# Patient Record
Sex: FEMALE | Race: WHITE
[De-identification: names, ages, dates, MRNs, and addresses within clinical notes are randomized per-mention and may not be internally consistent; named-entity substitution may affect disease eponyms.]

---

## 2017-04-04 ENCOUNTER — HOSPITAL ENCOUNTER (OUTPATIENT)
Dept: HOSPITAL 25 - ED | Age: 77
Setting detail: OBSERVATION
LOS: 1 days | Discharge: HOME | End: 2017-04-05
Attending: INTERNAL MEDICINE | Admitting: HOSPITALIST
Payer: MEDICARE

## 2017-04-04 DIAGNOSIS — E86.0: Primary | ICD-10-CM

## 2017-04-04 DIAGNOSIS — R94.31: ICD-10-CM

## 2017-04-04 DIAGNOSIS — Z79.899: ICD-10-CM

## 2017-04-04 DIAGNOSIS — I51.7: ICD-10-CM

## 2017-04-04 DIAGNOSIS — Z88.8: ICD-10-CM

## 2017-04-04 DIAGNOSIS — E11.49: ICD-10-CM

## 2017-04-04 DIAGNOSIS — R00.0: ICD-10-CM

## 2017-04-04 DIAGNOSIS — I10: ICD-10-CM

## 2017-04-04 DIAGNOSIS — E78.5: ICD-10-CM

## 2017-04-04 DIAGNOSIS — I25.10: ICD-10-CM

## 2017-04-04 DIAGNOSIS — M17.10: ICD-10-CM

## 2017-04-04 DIAGNOSIS — Z88.0: ICD-10-CM

## 2017-04-04 DIAGNOSIS — Z79.84: ICD-10-CM

## 2017-04-04 DIAGNOSIS — R06.02: ICD-10-CM

## 2017-04-04 DIAGNOSIS — J90: ICD-10-CM

## 2017-04-04 DIAGNOSIS — G47.00: ICD-10-CM

## 2017-04-04 DIAGNOSIS — Z79.82: ICD-10-CM

## 2017-04-04 DIAGNOSIS — Z95.1: ICD-10-CM

## 2017-04-04 DIAGNOSIS — I31.3: ICD-10-CM

## 2017-04-04 DIAGNOSIS — R42: ICD-10-CM

## 2017-04-04 LAB
ADD DIFF/SLIDE REVIEW?: (no result)
ALBUMIN SERPL BCG-MCNC: 3.5 G/DL (ref 3.2–5.2)
ALP SERPL-CCNC: 71 U/L (ref 34–104)
ALT SERPL W P-5'-P-CCNC: 12 U/L (ref 7–52)
ANION GAP SERPL CALC-SCNC: 10 MMOL/L (ref 2–11)
AST SERPL-CCNC: 17 U/L (ref 13–39)
BUN SERPL-MCNC: 19 MG/DL (ref 6–24)
BUN/CREAT SERPL: 18.6 (ref 8–20)
CALCIUM SERPL-MCNC: 9 MG/DL (ref 8.6–10.3)
CHLORIDE SERPL-SCNC: 92 MMOL/L (ref 101–111)
GLOBULIN SER CALC-MCNC: 2.9 G/DL (ref 2–4)
GLUCOSE SERPL-MCNC: 142 MG/DL (ref 70–100)
HCO3 SERPL-SCNC: 29 MMOL/L (ref 22–32)
HCT VFR BLD AUTO: 38 % (ref 35–47)
HGB BLD-MCNC: 12.3 G/DL (ref 12–16)
MCH RBC QN AUTO: 28 PG (ref 27–31)
MCHC RBC AUTO-ENTMCNC: 33 G/DL (ref 31–36)
MCV RBC AUTO: 86 FL (ref 80–97)
POTASSIUM SERPL-SCNC: 3.5 MMOL/L (ref 3.5–5)
PROT SERPL-MCNC: 6.4 G/DL (ref 6.4–8.9)
RBC # BLD AUTO: 4.4 10^6/UL (ref 4–5.4)
SODIUM SERPL-SCNC: 131 MMOL/L (ref 133–145)
TROPONIN I SERPL-MCNC: 0.67 NG/ML (ref ?–0.04)
WBC # BLD AUTO: 13.8 10^3/UL (ref 3.5–10.8)

## 2017-04-04 PROCEDURE — 71010: CPT

## 2017-04-04 PROCEDURE — 93306 TTE W/DOPPLER COMPLETE: CPT

## 2017-04-04 PROCEDURE — 84484 ASSAY OF TROPONIN QUANT: CPT

## 2017-04-04 PROCEDURE — 85652 RBC SED RATE AUTOMATED: CPT

## 2017-04-04 PROCEDURE — 71275 CT ANGIOGRAPHY CHEST: CPT

## 2017-04-04 PROCEDURE — 96375 TX/PRO/DX INJ NEW DRUG ADDON: CPT

## 2017-04-04 PROCEDURE — 86140 C-REACTIVE PROTEIN: CPT

## 2017-04-04 PROCEDURE — 83605 ASSAY OF LACTIC ACID: CPT

## 2017-04-04 PROCEDURE — 85025 COMPLETE CBC W/AUTO DIFF WBC: CPT

## 2017-04-04 PROCEDURE — 36415 COLL VENOUS BLD VENIPUNCTURE: CPT

## 2017-04-04 PROCEDURE — 99284 EMERGENCY DEPT VISIT MOD MDM: CPT

## 2017-04-04 PROCEDURE — G0378 HOSPITAL OBSERVATION PER HR: HCPCS

## 2017-04-04 PROCEDURE — 96372 THER/PROPH/DIAG INJ SC/IM: CPT

## 2017-04-04 PROCEDURE — 70450 CT HEAD/BRAIN W/O DYE: CPT

## 2017-04-04 PROCEDURE — 80053 COMPREHEN METABOLIC PANEL: CPT

## 2017-04-04 PROCEDURE — 80048 BASIC METABOLIC PNL TOTAL CA: CPT

## 2017-04-04 PROCEDURE — 96374 THER/PROPH/DIAG INJ IV PUSH: CPT

## 2017-04-04 PROCEDURE — 93005 ELECTROCARDIOGRAM TRACING: CPT

## 2017-04-04 PROCEDURE — 83880 ASSAY OF NATRIURETIC PEPTIDE: CPT

## 2017-04-04 NOTE — RAD
INDICATION: Shortness of breath one week after quadruple bypass surgery



COMPARISON: Most recent comparison chest x-rays dated March 01, 2017

 

TECHNIQUE: Single AP portable view of the chest was obtained.



FINDINGS: 



Image quality is compromised due to the relative inferiority of a portable chest x-ray.



There is interval appearance of sternotomy wires and surgical clips overlying the

mediastinum.



The heart and mediastinum exhibit normal size and contour. Atherosclerotic calcification

is seen overlying the arch of the aorta.



The lungs are grossly clear. There is no evidence of a large pleural effusion.



Visualized bones are normal for the patient's age.



IMPRESSION:  Postoperative findings as described above without radiographic evidence of

acute cardiopulmonary abnormality.

## 2017-04-04 NOTE — RAD
INDICATION:  Headache 1 week after quadruple bypass surgery



COMPARISON: Similar examination dated January 23, 2016



TECHNIQUE: Contiguous axial sections of the brain were obtained from the skull base to the

vertex without contrast.



FINDINGS: There is stable hypoattenuation involving the right frontal lobe relative to the

previous CT examination. Postsurgical changes include craniectomy involving the right

frontal lobe. A stable surgical clip is seen at the right of midline sella turcica.



The gray-white matter differentiation is otherwise adequately maintained. There is no

focal mass, mass effect or midline shift. There is no extra-axial hemorrhage.



The visualized portion of the paranasal sinuses and mastoid air cells appear clear.



IMPRESSION:  Postoperative findings similar in appearance to the January 23, 2016 CT

examination as described above.

## 2017-04-04 NOTE — RAD
INDICATION: Shortness of breath one week after quadruple bypass surgery.



COMPARISON: None

 

TECHNIQUE: Axial source images were acquired following the administration of 74 mL of

Visipaque 320 intravenously and utilizing CT angiographic technique. Coronal and sagittal

reconstructed images were constructed and reviewed.



FINDINGS: 



There there are no filling defects in the pulmonary arteries to indicate acute pulmonary

embolic disease.



The lungs are grossly clear.



There is a small right-sided pleural effusion.



There is fluid surrounding the descending thoracic aorta at the medial margin of the

pleural space causing compressive atelectasis of the adjacent left lower lobe.



Contrast injected into the left upper extremity is seen tracking along the azygos and

hemiazygos veins refluxing inferiorly.



Postoperative findings include sternotomy wires and surgical clips overlying the sternum.

There is a small pericardial effusion.



There is no mediastinal, hilar, or axillary lymphadenopathy.



The visualized osseous structures appear normal.





Limited views of the upper abdomen show no abnormalities.



IMPRESSION:  

1. No CT of evidence of pulmonary embolism.  

2. Postoperative findings related to the patient's coronary artery bypass are described

above. There is fluid tracking around the descending thoracic aorta collecting in the

pleural space and causing a small degree of compressive atelectasis along the medial

margin of the left lower lobe.

3. There is a small pericardial effusion. 

4. There is a very small right-sided pleural effusion.

## 2017-04-05 VITALS — SYSTOLIC BLOOD PRESSURE: 133 MMHG | DIASTOLIC BLOOD PRESSURE: 45 MMHG

## 2017-04-05 LAB
ANION GAP SERPL CALC-SCNC: 10 MMOL/L (ref 2–11)
BUN SERPL-MCNC: 23 MG/DL (ref 6–24)
BUN/CREAT SERPL: 21.9 (ref 8–20)
CALCIUM SERPL-MCNC: 8.8 MG/DL (ref 8.6–10.3)
CHLORIDE SERPL-SCNC: 92 MMOL/L (ref 101–111)
GLUCOSE SERPL-MCNC: 145 MG/DL (ref 70–100)
HCO3 SERPL-SCNC: 27 MMOL/L (ref 22–32)
HCT VFR BLD AUTO: 35 % (ref 35–47)
HGB BLD-MCNC: 11.4 G/DL (ref 12–16)
MCH RBC QN AUTO: 28 PG (ref 27–31)
MCHC RBC AUTO-ENTMCNC: 33 G/DL (ref 31–36)
MCV RBC AUTO: 86 FL (ref 80–97)
POTASSIUM SERPL-SCNC: 3.9 MMOL/L (ref 3.5–5)
RBC # BLD AUTO: 4.02 10^6/UL (ref 4–5.4)
SODIUM SERPL-SCNC: 129 MMOL/L (ref 133–145)
WBC # BLD AUTO: 12.3 10^3/UL (ref 3.5–10.8)

## 2017-04-05 RX ADMIN — INSULIN LISPRO SCH UNITS: 100 INJECTION, SOLUTION INTRAVENOUS; SUBCUTANEOUS at 12:39

## 2017-04-05 RX ADMIN — HEPARIN SODIUM SCH UNITS: 5000 INJECTION INTRAVENOUS; SUBCUTANEOUS at 06:24

## 2017-04-05 RX ADMIN — NATEGLINIDE SCH: 60 TABLET ORAL at 12:27

## 2017-04-05 RX ADMIN — INSULIN LISPRO SCH UNITS: 100 INJECTION, SOLUTION INTRAVENOUS; SUBCUTANEOUS at 06:23

## 2017-04-05 RX ADMIN — INSULIN LISPRO SCH UNITS: 100 INJECTION, SOLUTION INTRAVENOUS; SUBCUTANEOUS at 03:03

## 2017-04-05 RX ADMIN — NATEGLINIDE SCH: 60 TABLET ORAL at 08:42

## 2017-04-05 RX ADMIN — HEPARIN SODIUM SCH UNITS: 5000 INJECTION INTRAVENOUS; SUBCUTANEOUS at 12:38

## 2017-04-05 NOTE — ECHO
Patient:      ALEXEY ARANDA Rec#:     [id number]            :          1940          

Date:         2017            Age:          77y                 

Account#:     [id number]          Height:       157.48 cm / 62.0 in

Accession#:   [id number]           Weight:       74.84 kg / 164.9 lbs

Sex:          F                     BSA:          1.76

Room#:        [facility]                 

Admit Date#:  2017          

Type:         Inpatient

 

Referring:    Ellis Mendes MD

Reading:      Paola Parikh MD

Sonographer:  Cande Smith RDCS

CC:           Evan Dixon MD

______________________________________________________________________

 

Transthoracic Echocardiogram

 

Indication:

CAD/s/p CABG

BP:           109/42

HR:           81

Rhythm:       NSR

 

Findings     

History:

9 days s/p CABG x4,CAD,DM,HTN,HLD,former smoker. 

 

Technical Comments:

The study is technically limited due to the patient's smoking history.  

Completed at 1138. 

 

Left Ventricle:

The left ventricular chamber size is decreased. Moderate concentric left

ventricular hypertrophy is observed. The estimated ejection fraction is

55-60%.  Post surgical hypokinesis of the interventricular septum is

observed consistent with coronary artery bypass.  Abnormal left

ventricular diastolic function is observed. 

 

Left Atrium:

The left atrial chamber size is normal. 

 

Right Ventricle:

The right ventricular cavity size is normal. The right ventricular

global systolic function is normal. The septum has abnormal paradoxical

motion consistent with post-operative pressure. 

 

Right Atrium:

The right atrial cavity size is normal. 

 

Aortic Valve:

The aortic valve is trileaflet. The aortic valve leaflets are mildly

thickened.  Best visualization from A5C. There is mild aortic

regurgitation.  

 

Mitral Valve:

There is mitral annular calcification. The mitral valve leaflets are

mildly thickened. There is trace to mild mitral regurgitation. There is

no evidence of mitral stenosis. 

 

Tricuspid Valve:

The tricuspid valve leaflets are normal.  There is mild tricuspid

regurgitation. There is evidence of mild to moderate pulmonary

hypertension. There is no tricuspid stenosis. 

 

Pulmonic Valve:

The pulmonic valve appears normal. There is no evidence of pulmonic

regurgitation. There is no pulmonic stenosis.  

 

Pericardium:

There is no pericardial effusion. 

 

Aorta:

There is no dilatation of the ascending aorta. There is no dilatation of

the aortic arch. There is no dilation of the aortic root. 

 

Pulmonary Artery:

The main pulmonary artery is not well visualized. 

 

Venous:

The venous system is not well visualized. 

 

Conclusions

Moderate concentric left ventricular hypertrophy is observed.

Expected septal dyskinesis c/w post CABG noted, all other areas of the

myocardium moved normally with normal to hyperdynamic systolic

function.

The estimated ejection fraction is 55-60%. 

Abnormal left ventricular diastolic function is observed.

The right ventricular global systolic function is normal.

Aortic valve sclerosis with mild aortic regurgitation, no reversal of

flow in the descending aorta. 

There is trace to mild mitral regurgitation.

There is mild tricuspid regurgitation.

There is evidence of mild to moderate pulmonary hypertension:  48 mmHg.

There is no pericardial effusion.

Technically limited as expected with recent CABG.

Compared with prior echo of 3/9/17, left ventricular function is stable,

RV no longer hyperdynamic, PA pressure has increased from 30 mmHg.

 

Measurements     

Name                    Value         Normal Range            

RVIDd (AP) 2D           3 cm          (0.9 - 2.6)             

RVDdMajor (2D)          3.8 cm        (2.2 - 4.4)             

RAd ISD 4CH             4.3 cm        (3.4 - 4.9)             

RA (A4C)W               3.3 cm        (2.9 - 4.6)             

IVSd (2D)               1.4 cm        (0.6 - 1)               

LVPWd (2D)              1.3 cm        (0.6 - 1)               

LVIDd (2D)              3.1 cm        (3.6 - 5.4)             

LVIDs (2D)              2.1 cm        -                        

LV FS (2D)              32 %          (25 - 45)               

Aortic Annulus          1.5 cm        (1.4 - 2.6)             

Ao root diameter (2D)   3.2 cm        (2.1 - 3.5)             

Ascending Ao            3.2 cm        (2.1 - 3.4)             

Aortic arch             2.7 cm        (1.8 - 3.4)             

LA dimension (AP) 2D    3.7 cm        (2.3 - 3.8)             

LAd ISD 4CH             4.9 cm        (2.9 - 5.3)             

LA ISD 4CH W            3.5 cm        (2.5 - 4.5)             

 

Name                    Value         Normal Range            

LA ESV SP 4CH (A/L)     37 ml         -                        

LA ESV SP 2CH (A/L)     36 ml         -                        

LA ESV BP (A/L)         37 ml         -                        

LA ESV BP (A/L) index   20.9 ml/m2    -                        

LA ESV SP 4CH (MOD)     33 ml         -                        

LA ESV SP 2CH (MOD)     36 ml         -                        

 

Name                    Value         Normal Range            

MV E-wave Vmax          0.7 m/sec     -                        

MV deceleration time    253 msec      -                        

MV A-wave Vmax          1.1 m/sec     -                        

MV E:A ratio            0.68 ratio    -                        

LV septal e' Vmax       0.04 m/sec    -                        

LV lateral e' Vmax      0.04 m/sec    -                        

LV E:e' septal ratio    17.5 ratio    -                        

LV E:e' lateral ratio   17.5 ratio    -                        

 

Name                    Value         Normal Range            

AV Vmax                 1.9 m/sec     -                        

AV VTI                  36.2 cm       -                        

AV peak gradient        13.18 mmHg    -                        

AV mean gradient        7.18 mmHg     -                        

LVOT Vmax               1.6 m/sec     -                        

LVOT VTI                38 cm         -                        

LVOT peak gradient      9.91 mmHg     -                        

LVOT mean gradient      4.61 mmHg     -                        

AR PHT                  341 msec      -                        

AR peak gradient        60.06 mmHg    -                        

 

Name                    Value         Normal Range            

TR Vmax                 3.2 m/sec     -                        

TR peak gradient        40 mmHg       -                        

RAP                     8 mmHg        -                        

RVSP                    48 mmHg       -                        

 

Name                    Value         Normal Range            

PV Vmax                 0.7 m/sec     -                        

PV peak gradient        1.9 mmHg      -                        

 

Electronically signed by: Paola Parikh MD on 2017 12:09:37

## 2017-04-05 NOTE — ED
Yung GAONA Adam, scribed for Raul Groves MD on 04/04/17 at 1919 .





Complex/Multi-Sys Presentation





- HPI Summary


HPI Summary: 


Pt is a 77 year old female presenting with SOB and multiple other complaints 

since a CABG last week. She states that the visiting nurse was at the house 

today and the pt was getting short of breath, dizzy, and diaphoretic while 

walking to the kitchen. She currently c/o pain in her upper back between the 

spine and shoulder blade that set on 2 days ago. She had a severe HA yesterday. 

She also c/o blurred vision and humming in her ears today. She denies fever, 

chills, vomiting, diarrhea, cough, weight gain. Pt was discharged 4 days ago 

after having CABG x4 in [place] 1 week ago.  








- History Of Current Complaint


Chief Complaint: EDShortnessOfBreath


Time Seen by Provider: 04/04/17 17:40


Hx Obtained From: Patient


Onset/Duration: Gradual Onset, Lasting Days, Still Present


Timing: Constant


Severity Currently: Moderate


Severity Initially: Moderate


Location: Pain At: - Upper back


Character: Typical Headache


Associated Signs And Symptoms: Positive: Dizziness, SOB, Diaphoresis, Other - 

Blurred vision, humming in ears





- Allergies/Home Medications


Allergies/Adverse Reactions: 


 Allergies











Allergy/AdvReac Type Severity Reaction Status Date / Time


 


Penicillins Allergy Unknown See Comment Verified 10/28/16 06:18


 


Dapagliflozin [From Farxiga] Allergy  Unknown Verified 03/23/17 16:25





   Reaction  





   Details  


 


EPIDURAL Allergy  PT STATES Uncoded 10/28/16 06:18





   SHE GOT TO  





   MUCH  











Home Medications: 


 Home Medications





Aspirin EC Low Dose* [Ecotrin EC Low Dose 81 MG*] 81 mg PO DAILY 04/04/17 [

History Confirmed 04/04/17]


Furosemide TAB* [Lasix TAB*] 40 mg PO DAILY 04/04/17 [History Confirmed 04/04/17

]


HYDROcodone/ACETAMIN 5-325 MG* [Norco 5-325 TAB*] 1 - 2 tab PO Q4H PRN MDD 12 

tabs 04/04/17 [History Confirmed 04/04/17]


Magnesium Oxide TAB* [MagOx 400 TAB*] 400 mg PO DAILY 04/04/17 [History 

Confirmed 04/04/17]


Metoclopramide TAB* [Reglan TAB*] 5 mg PO TID AC PRN 04/04/17 [History 

Confirmed 04/04/17]


Nateglinide(NF) [Starlix(NF)] 60 mg PO TID AC 04/04/17 [History Confirmed 04/04/ 17]


Potassium Chlor TAB* [Klor Con ER TAB*] 20 meq PO DAILY 04/04/17 [History 

Confirmed 04/04/17]


Simvastatin (NF) [Zocor (NF)] 40 mg PO BEDTIME 04/04/17 [History Confirmed 04/04 /17]


metFORMIN* [Glucophage 1000 MG TAB *] 1,000 mg PO BID WITH MEALS 04/04/17 [

History Confirmed 04/04/17]











PMH/Surg Hx/FS Hx/Imm Hx


Endocrine/Hematology History: Reports: Hx Diabetes - type 2


   Denies: Hx Thyroid Disease


Cardiovascular History: Reports: Hx Hypertension


   Denies: Other Cardiovascular Problems/Disorders


Respiratory History: Reports: Hx Sleep Apnea - NOT DIAGNOSED


   Denies: Hx Asthma, Hx Chronic Obstructive Pulmonary Disease (COPD)


GI History: Reports: Hx Hiatal Hernia - SURGICALLY REPAIRED 1993


   Denies: Hx Ulcer, Other GI Disorders


Musculoskeletal History: Reports: Hx Arthritis - RIGHT KNEE, Hx Rheumatoid 

Arthritis


   Denies: Hx Osteoporosis, Other Musculoskeletal History


Sensory History: Reports: Hx Cataracts - ROMERO, Hx Contacts or Glasses - GLASSES


   Denies: Hx Hearing Aid


Opthamlomology History: Reports: Hx Cataracts - ROMERO, Hx Contacts or Glasses - 

GLASSES


Neurological History: Reports: Hx Migraine - PAST ONLY, Other Neuro Impairments/

Disorders - BRAIN ANEURYSM 1994 WITH METAL CLIP


Psychiatric History: Reports: Hx Anxiety - MILD, NO MEDS





- Surgical History


Surgery Procedure, Year, and Place: 4 c sections, gallbladder removed, many 

hernias, eye sugery


Hx Anesthesia Reactions: Yes - N/V





- Immunization History


Date of Tetanus Vaccine: Unk


Date of Influenza Vaccine: Fall 2015


Infectious Disease History: 


   Denies: Hx Hepatitis, Hx Human Immunodeficiency Virus (HIV), Traveled 

Outside the [place] in Last 30 Days





- Family History


Known Family History: Positive: Other - Negative: osteoporosis, breast cancer, 

malignant hyperthermia





- Social History


Occupation: Retired


Lives: Alone


Alcohol Use: None


Hx Substance Use: No


Substance Use Type: Reports: None


Hx Tobacco Use: No


Smoking Status (MU): Never Smoked Tobacco


Have You Smoked in the Last Year: No





Review of Systems


Positive: Skin Diaphoresis.  Negative: Fever, Chills


Positive: Blurred Vision.  Negative: Erythema


Positive: Other - Humming in ears.  Negative: Sore Throat


Negative: Chest Pain


Positive: Shortness Of Breath.  Negative: Cough


Negative: Abdominal Pain, Vomiting, Nausea


Negative: dysuria, hematuria


Positive: Other - Back pain.  Negative: Edema


Negative: Rash


Neurological: Other - Dizziness


Positive: Headache


All Other Systems Reviewed And Are Negative: Yes





Physical Exam





- Summary


Physical Exam Summary: 


Constitutional: Well-developed, Well-nourished, Alert. (-) Distressed


Skin: Warm, Dry


HENT: Normocephalic; Atraumatic


Eyes: Conjunctiva normal


Neck: Musculoskeletal ROM normal neck. (-) JVD, (-) Stridor, (-) Tracheal 

deviation


Cardio: Rhythm regular, rate normal, Heart sounds normal; Intact distal pulses; 

The pedal pulses are 2+ and symmetric. Radial pulses are 2+ and symmetric.  (-) 

Murmur


Pulmonary/Chest wall: Effort normal. (-) Respiratory distress, (-) Wheezes, (-) 

Rales


Abd: Soft, (-) Tenderness,  (-) Distension, (-) Guarding, (-) Rebound


Musculoskeletal: (-) Edema


Lymph: (-) Cervical adenopathy


Neuro: Alert, Oriented x3


Psych: Mood and affect Normal





Triage Information Reviewed: Yes


Vital Signs On Initial Exam: 


 Initial Vitals











Temp Pulse Resp BP Pulse Ox


 


 98.4 F   115   20   114/54   98 


 


 04/04/17 17:42  04/04/17 17:42  04/04/17 17:42  04/04/17 17:42  04/04/17 17:42











Vital Signs Reviewed: Yes





- [place] Coma Scale


Coma Scale Total: 15





Diagnostics





- Vital Signs


 Vital Signs











  Temp Pulse Resp BP Pulse Ox


 


 04/04/17 18:00    18  114/54 


 


 04/04/17 17:42  98.4 F  115  20  114/54  98














- Laboratory


Result Diagrams: 


 04/04/17 19:55





 04/04/17 19:55


Lab Statement: Any lab studies that have been ordered have been reviewed, and 

results considered in the medical decision making process.





- Radiology


  ** CXR


Radiology Interpretation Completed By: Radiologist - IMPRESSION:  Postoperative 

findings as described above without radiographic evidence of acute 

cardiopulmonary abnormality.





- CT


  ** CHEST/THORAX CTA


CT Interpretation Completed By: Radiologist - IMPRESSION:   1. No CT of 

evidence of pulmonary embolism.   2. Postoperative findings related to the 

patient's coronary artery bypass are described above. There is fluid tracking 

around the descending thoracic aorta collecting in the pleural space and 

causing a small degree of compressive atelectasis along the medial margin of 

the left lower lobe. 3. There is a small pericardial effusion.  4. There is a 

very small right-sided pleural effusion.





  ** BRAIN


CT Interpretation Completed By: Radiologist - IMPRESSION:  Postoperative 

findings similar in appearance to the January 23, 2016 CT examination as 

described above.





- EKG


  ** 20:03


Cardiac Rate: Tachycardia - 112 BPM


EKG Rhythm: Sinus Tachycardia





- Additional Comments


Diagnostic Additional Comments: 


Troponin I - 0.67








Complex Multi-Symp Course/Dx


Course Of Treatment: Initial patient contact made at 19:15.  Medical record 

review: Pt was having outpatient cardiac catheterization and was transferred to 

[facility] for severe three vessel disease. It appears that no troponin 

testing was done at [facility]. Pt developed post-op anemia and 

trasfused 2 units of packed red blood cells.


Assessment/Plan: Elevation of troponin post-operatively. Less likely MI. No 

baseline labs for comparison. No evidence of fluid overload. Pt is actually 

losing weight.





- Diagnoses


Provider Diagnoses: 


 Pleural effusion, Pericardial effusion, Shortness of breath, Elevated troponin








- Physician Notifications


Discussed Care Of Patient With: Dr. Duenas (cardio) at 22:15. We discussed lab 

values, physical exam, and vital signs. He recommends the pt be admitted to [facility] 

with serial troponin and echocardiogram in the morning. Rule out progressive 

pericardial effusion. He also recommended utilizing additional nonsteroidals. 

Also sed rate and CRP. We felt that the findings on the CT were likely post-

operative.





Discharge





- Discharge Plan


Condition: Stable


Disposition: ADMITTED TO [facility]


Referrals: 


Evan Dixon MD [Primary Care Provider] - 





The documentation as recorded by the Yung ruiz Adam accurately reflects 

the service I personally performed and the decisions made by me, Raul Groves MD.

## 2017-04-05 NOTE — HP
HISTORY AND PHYSICAL:

 

DATE OF ADMISSION:  17

 

PRIMARY CARE PHYSICIAN:  Dr. Evan Dixon.

 

HISTORY OF PRESENT ILLNESS:  The patient is a 77-year-old woman, status post 
coronary artery bypass just 1 week ago whose visiting nurse was seeing her when 
she got out of her chair and became lightheaded and dizzy.  She was also 
sweating profusely.  She denied any chest pain, but was little short of breath.
  She had no loss of consciousness.  She had no palpitations.  She did lie on 
the table for quite a while and finally, the symptoms resolved.  She had nausea 
and vomiting. Although she has not eaten much recently and she has occasionally 
taken her pain medications of Norco.  Furthermore, she was placed on furosemide 
recently.

 

PAST MEDICAL HISTORY:  As noted, the patient has 4-vessel CABG on 17 at 
[place], coronary artery disease, bezoar, gastroparesis, type 2 diabetes 
mellitus, osteoarthritis of the knee, carpal tunnel syndrome, insomnia, 
diabetic neurological disorder, hypertension, hyperlipidemia.

 

PAST SURGICAL HISTORY:  Includes CABG, hernia repair,  x4, Nissen 
fundoplication, cholecystectomy, cerebral aneurysm repair, cataract removal, 
partial meniscectomy.

 

CURRENT MEDICATIONS:  As follows:

1.  Metformin 1000 mg twice daily.

2.  Simvastatin 40 mg at bedtime.

3.  Potassium chloride 20 mEq daily.

4.  Starlix 60 mg 3 times a day.

5.  Metoprolol titrate 25 mg twice daily.

6.  Reglan 5 mg 3 times a day as needed.

7.  Magnesium oxide 100 mg daily.

8.  Norco 5/325 as needed for pain.

9.  Furosemide 40 mg daily, supposed to end tomorrow.

10.  Enalapril 10 mg twice daily, was just recently discontinued.

11.  Aspirin 81 mg daily.

 

ALLERGIES:  She has allergy/adverse reaction to PENICILLIN and DAPAGLIFLOZIN.

 

FAMILY HISTORY:  Significant for diabetes and cancer.  Father had diabetes.

 

SOCIAL HISTORY:  She is a .  Her daughter, Diane Dawkins, is her healthcare 
proxy.  She is ex-tobacco, smoked a pack a day for 2 years, quit at age 20.  No 
alcohol or recreational drug use.  She is retired.

 

REVIEW OF SYSTEMS:  A 14-point review of systems was completed with the 
patient. All pertinent positives and negatives are in the history of present 
illness, otherwise it is negative.

 

                               PHYSICAL EXAMINATION

 

GENERAL:  Pleasant woman, lying in bed, in no acute distress.

 

VITAL SIGNS:  Blood pressure 125/76, pulse ox 96%, respiratory rate 26 breaths 
per minute, heart rate 108 beats per minute, temperature is 98.4 degrees.

 

HEENT:  Normocephalic, atraumatic.  Pupils equal, round and reactive to light. 
Moist Mucous membranes.

 

NECK:  Supple.  No JVD, bruits, palpable thyroid, or lymphadenopathy.

 

CHEST:  Clear to auscultation and percussion bilaterally.

 

CARDIOVASCULAR:  S1, S2 appreciated.

 

ABDOMEN:  Positive bowel sounds in all 4 quadrants.  Soft, nontender.

 

EXTREMITIES:  No cyanosis, clubbing, or edema.  +2 peripheral pulses 
bilaterally.

 

NEURO:  Alert and oriented x3.  Moves all extremities.

 

SKIN:  No rashes or abnormalities.

 

 DIAGNOSTIC STUDIES/LAB DATA:  White count 13.8, hemoglobin 12.3, hematocrit 38
, platelets 171.  Sodium 131, potassium 3.5, chloride 92, CO2 29, BUN 19, 
creatinine 1.00, troponin is 0.67, BNP is 271.  EKG shows sinus tachycardia at 
112 beats per minute, normal axis, Q's in II, III, and F, LVH.  



Brain CT shows postoperative findings similar to 16.  Examination as 
described above.  



Chest x-ray was interpreted by Radiology as postoperative findings described 
above without radiographic evidence of acute cardiopulmonary abnormality.  



CTA of the chest shows as interpreted by Radiology no CT evidence of pulmonary 
embolism.  Post-operative findings related to the patient's coronary artery 
bypass as described above.  Fluid tracking around the descending thoracic aorta 
collecting in the pleural space and causing a small degree compression 
atelectasis along the medial margin of the left lower lobe.  There is a small 
pericardial effusion.  There is a small right-sided pleural effusion.

 

ASSESSMENT AND PLAN:

1.  Dizziness and lightheadedness, status post CABG.  I highly doubt this is 
secondary to her recent surgery or a manifestation of coronary artery disease.  
Her troponin likely is elevated from her recent surgery.  We will cycle them, 
however. I will place her on telemetry.  I will also get a transverse 
echocardiogram because of the small pericardial effusion, but again this is 
likely secondary to some inflammation post CABG.  I think her lightheadedness 
and dizziness may be from being on furosemide, not eating much and her nausea 
and vomiting could be from the narcotics and not eating much.  We will observe 
overnight by anticipation and will do well and go home fairly shortly.

2.  Diabetes mellitus.  Hold metformin, fingersticks with sliding scale insulin.

3.  Hypertension, good control.  Continue current regimen.

4.  Hyperlipidemia.  Continue statin, stable.

5.  FEN.  NPO after midnight just in case she needs intervention of some kind.

6.  The patient is a full code.

 

TIME SPENT:  Over 75 minutes were spent on this H and P, more than 40 minutes 
of which was spent in direct face-to-face contact with the patient in evaluation
, physical exam, counseling, and coordination of care.

 

 CC:  Dr. Evan Dixon; Scott Hoang,  *

 

[id number]/CPS #: [id number]

SARAVANAN

## 2017-04-06 NOTE — DS
DISCHARGE SUMMARY:

 

DATE OF ADMISSION:  04/04/17

 

DATE OF DISCHARGE:  04/05/17

 

PRIMARY DIAGNOSIS:  Dehydration.

 

SECONDARY DIAGNOSES:  Include:

1.  Coronary artery disease, status post coronary artery bypass grafting on 03/
27/17, [facility].

2.  Type 2 diabetes.

3.  Hypertension.

4.  Hyperlipidemia.

 

MEDICATIONS ON DISCHARGE:

1.  Enalapril 10 mg twice daily.

2.  Zocor 50 mg at bedtime.

3.  Starlix 60 mg 3 times a day with meals.

4.  Metoprolol tartrate 25 mg twice daily.

5.  Reglan 5 mg 3 times a day with meals as needed.

6.  Metformin 1000 mg twice daily with meals.

7.  Magnesium oxide 400 mg daily.

8.  Norco 5/325 one to two tabs every 4 hours as needed for pain.

9.  Aspirin 81 mg daily.

 

PERTINENT LABORATORY DATA:  Troponin I 0.68 on three consecutive checks and 
then decreased to 0.54. ESR is 39 with a white count of 12.5, which is 78% 
neutrophils.

 

PERTINENT DIAGNOSTIC DATA:  Chest CTA, impression:  No CT evidence of pulmonary 
embolism.  Postoperative findings related to the patient's CABG including _____ 
tracking above the descending thoracic aorta collecting in the pleural space 
and causing a small degree of compressive atelectasis along the medial margin 
of the left lower lobe.  Small pericardial effusion.  Very small right-sided 
pleural effusion.

 

Transthoracic echocardiogram, impression:  Moderate concentric left ventricular 
hypertrophy.  Expected septal dyskinesis consistent with post CABG, all other 
areas of myocardium move normally with normal-to-hyperdynamic systolic 
function. Estimated EF 55% to 60%.  Abnormal left ventricular diastolic 
function.  RV global systolic function is normal.  Aortic valve sclerosis with 
mild AR.  Trace-to-mild MR, mild TR, evidence of mild-to-moderate pulmonary 
hypertension.  No pericardial effusion.

 

HISTORY OF PRESENT ILLNESS AND HOSPITAL COURSE:  This is a pleasant 77-year-old 
female, past medical history recent 4-vessel CABG on March 27th, discharged 
home 4 days prior to presentation.  She had been feeling well for the first 2 
days prior to presentation; however, developed increasing shortness of breath 
over the day prior to presentation.  She noted when she got up to walk, she 
became severely lightheaded.  Of note, she was discharged on Lasix for a 
limited course for the last day to be today.  She felt severely thirsty.  She 
was admitted to the hospital.  Her troponins were monitored and she received IV 
fluids.  With the receipt of approximately a liter to a liter and a half of IV 
fluids, the patient's lightheadedness improved.  She had negative orthostatic 
vital signs and her heart rate improved from the 100s down to the 90s.  Of note
, last recorded heart rate in the computer is 112; however, is now high 80s to 
low 90s.  She was ruled out for pulmonary embolism.  She was ambulating back 
and forth to the bathroom without complaints.  No lightheadedness.  No chest 
pain.  No shortness of breath and is anxious to return home.  Her exam is 
notable for absence of infection in her sternotomy nor in her left lower 
extremity venous graft harvest sites.  Her lungs are clear, although a small 
amount of atelectasis and a small pleural effusion may have been contributing 
to her increasing shortness of breath status post her hospital stay and 
operation.

 

FOLLOWUP INSTRUCTIONS:  At followup, please:

1.  Follow up for resolution of symptoms.

2.  Monitor volume status off Lasix.

3.  No other specific labs or vitals that need followup.

 

Reasons to return to the hospital including but not limited to recurrent or 
worsening symptoms including chest pain, shortness of breath, nausea, vomiting, 
lightheadedness, loss of consciousness, near loss of consciousness, fever, 
chills, night sweats, bleeding from any source, increased redness or pain in 
any place from her sternotomy wound or graft sites were discussed with the 
patient.  She acknowledged understanding.

 

TIME SPENT:  Greater than 50 minutes was spent on discharge of this patient, 
greater than half was spent face-to-face with the patient.

 

 [id number]/[facility] #: [id number]

SARAVANAN

## 2017-04-25 ENCOUNTER — HOSPITAL ENCOUNTER (INPATIENT)
Dept: HOSPITAL 25 - ED | Age: 77
LOS: 2 days | Discharge: HOME | DRG: 176 | End: 2017-04-27
Attending: HOSPITALIST | Admitting: HOSPITALIST
Payer: MEDICARE

## 2017-04-25 DIAGNOSIS — E13.43: ICD-10-CM

## 2017-04-25 DIAGNOSIS — F41.9: ICD-10-CM

## 2017-04-25 DIAGNOSIS — I26.99: Primary | ICD-10-CM

## 2017-04-25 DIAGNOSIS — E78.5: ICD-10-CM

## 2017-04-25 DIAGNOSIS — M17.11: ICD-10-CM

## 2017-04-25 DIAGNOSIS — Z87.891: ICD-10-CM

## 2017-04-25 DIAGNOSIS — I27.2: ICD-10-CM

## 2017-04-25 DIAGNOSIS — Z79.82: ICD-10-CM

## 2017-04-25 DIAGNOSIS — H53.8: ICD-10-CM

## 2017-04-25 DIAGNOSIS — G56.00: ICD-10-CM

## 2017-04-25 DIAGNOSIS — I25.10: ICD-10-CM

## 2017-04-25 DIAGNOSIS — K31.84: ICD-10-CM

## 2017-04-25 DIAGNOSIS — Z88.8: ICD-10-CM

## 2017-04-25 DIAGNOSIS — I08.3: ICD-10-CM

## 2017-04-25 DIAGNOSIS — Z79.01: ICD-10-CM

## 2017-04-25 DIAGNOSIS — G43.909: ICD-10-CM

## 2017-04-25 DIAGNOSIS — Z95.1: ICD-10-CM

## 2017-04-25 DIAGNOSIS — Z86.718: ICD-10-CM

## 2017-04-25 DIAGNOSIS — M06.9: ICD-10-CM

## 2017-04-25 DIAGNOSIS — Z98.41: ICD-10-CM

## 2017-04-25 DIAGNOSIS — Z80.0: ICD-10-CM

## 2017-04-25 DIAGNOSIS — Z98.42: ICD-10-CM

## 2017-04-25 DIAGNOSIS — I10: ICD-10-CM

## 2017-04-25 DIAGNOSIS — Z80.3: ICD-10-CM

## 2017-04-25 DIAGNOSIS — Z83.3: ICD-10-CM

## 2017-04-25 DIAGNOSIS — E11.40: ICD-10-CM

## 2017-04-25 DIAGNOSIS — Z79.84: ICD-10-CM

## 2017-04-25 DIAGNOSIS — G47.30: ICD-10-CM

## 2017-04-25 DIAGNOSIS — Z88.0: ICD-10-CM

## 2017-04-25 LAB
ALBUMIN SERPL BCG-MCNC: 3.7 G/DL (ref 3.2–5.2)
ALP SERPL-CCNC: 86 U/L (ref 34–104)
ALT SERPL W P-5'-P-CCNC: 11 U/L (ref 7–52)
ANION GAP SERPL CALC-SCNC: 9 MMOL/L (ref 2–11)
AST SERPL-CCNC: 15 U/L (ref 13–39)
BUN SERPL-MCNC: 12 MG/DL (ref 6–24)
BUN/CREAT SERPL: 17.1 (ref 8–20)
CALCIUM SERPL-MCNC: 9.4 MG/DL (ref 8.6–10.3)
CHLORIDE SERPL-SCNC: 101 MMOL/L (ref 101–111)
CK SERPL-CCNC: 17 U/L (ref 10–223)
GLOBULIN SER CALC-MCNC: 3.2 G/DL (ref 2–4)
GLUCOSE SERPL-MCNC: 99 MG/DL (ref 70–100)
HCO3 SERPL-SCNC: 24 MMOL/L (ref 22–32)
HCT VFR BLD AUTO: 34 % (ref 35–47)
HGB BLD-MCNC: 10.8 G/DL (ref 12–16)
LIPASE SERPL-CCNC: 21 U/L (ref 11–82)
MAGNESIUM SERPL-MCNC: 1.8 MG/DL (ref 1.9–2.7)
MCH RBC QN AUTO: 27 PG (ref 27–31)
MCHC RBC AUTO-ENTMCNC: 32 G/DL (ref 31–36)
MCV RBC AUTO: 86 FL (ref 80–97)
POTASSIUM SERPL-SCNC: 4.7 MMOL/L (ref 3.5–5)
PROT SERPL-MCNC: 6.9 G/DL (ref 6.4–8.9)
RBC # BLD AUTO: 3.97 10^6/UL (ref 4–5.4)
SODIUM SERPL-SCNC: 134 MMOL/L (ref 133–145)
TROPONIN I SERPL-MCNC: 0.01 NG/ML (ref ?–0.04)
TSH SERPL-ACNC: 0.29 MCIU/ML (ref 0.34–5.6)
WBC # BLD AUTO: 7.2 10^3/UL (ref 3.5–10.8)

## 2017-04-25 PROCEDURE — 70450 CT HEAD/BRAIN W/O DYE: CPT

## 2017-04-25 PROCEDURE — 87186 SC STD MICRODIL/AGAR DIL: CPT

## 2017-04-25 PROCEDURE — 71275 CT ANGIOGRAPHY CHEST: CPT

## 2017-04-25 PROCEDURE — 84443 ASSAY THYROID STIM HORMONE: CPT

## 2017-04-25 PROCEDURE — 36415 COLL VENOUS BLD VENIPUNCTURE: CPT

## 2017-04-25 PROCEDURE — 82553 CREATINE MB FRACTION: CPT

## 2017-04-25 PROCEDURE — 93005 ELECTROCARDIOGRAM TRACING: CPT

## 2017-04-25 PROCEDURE — 81015 MICROSCOPIC EXAM OF URINE: CPT

## 2017-04-25 PROCEDURE — 83880 ASSAY OF NATRIURETIC PEPTIDE: CPT

## 2017-04-25 PROCEDURE — 93306 TTE W/DOPPLER COMPLETE: CPT

## 2017-04-25 PROCEDURE — 70496 CT ANGIOGRAPHY HEAD: CPT

## 2017-04-25 PROCEDURE — 85025 COMPLETE CBC W/AUTO DIFF WBC: CPT

## 2017-04-25 PROCEDURE — 71010: CPT

## 2017-04-25 PROCEDURE — 87086 URINE CULTURE/COLONY COUNT: CPT

## 2017-04-25 PROCEDURE — 81003 URINALYSIS AUTO W/O SCOPE: CPT

## 2017-04-25 PROCEDURE — 87077 CULTURE AEROBIC IDENTIFY: CPT

## 2017-04-25 PROCEDURE — 70498 CT ANGIOGRAPHY NECK: CPT

## 2017-04-25 PROCEDURE — 85610 PROTHROMBIN TIME: CPT

## 2017-04-25 PROCEDURE — 86140 C-REACTIVE PROTEIN: CPT

## 2017-04-25 PROCEDURE — 82550 ASSAY OF CK (CPK): CPT

## 2017-04-25 PROCEDURE — 84484 ASSAY OF TROPONIN QUANT: CPT

## 2017-04-25 PROCEDURE — 83735 ASSAY OF MAGNESIUM: CPT

## 2017-04-25 PROCEDURE — 83605 ASSAY OF LACTIC ACID: CPT

## 2017-04-25 PROCEDURE — 85379 FIBRIN DEGRADATION QUANT: CPT

## 2017-04-25 PROCEDURE — 83690 ASSAY OF LIPASE: CPT

## 2017-04-25 PROCEDURE — 85730 THROMBOPLASTIN TIME PARTIAL: CPT

## 2017-04-25 PROCEDURE — 80048 BASIC METABOLIC PNL TOTAL CA: CPT

## 2017-04-25 PROCEDURE — 80053 COMPREHEN METABOLIC PANEL: CPT

## 2017-04-25 PROCEDURE — 94760 N-INVAS EAR/PLS OXIMETRY 1: CPT

## 2017-04-25 RX ADMIN — ATORVASTATIN CALCIUM SCH MG: 20 TABLET, FILM COATED ORAL at 20:31

## 2017-04-25 RX ADMIN — SODIUM CHLORIDE SCH MLS/HR: 900 IRRIGANT IRRIGATION at 22:16

## 2017-04-25 RX ADMIN — SODIUM CHLORIDE SCH MLS/HR: 900 IRRIGANT IRRIGATION at 15:30

## 2017-04-25 NOTE — RAD
INDICATION:  Feels ill.



COMPARISON:  Comparison is made with prior chest x-ray study from April 12, 2017.



TECHNIQUE: A portable view of the chest was obtained.



FINDINGS: The patient appears to be status post coronary artery bypass surgery. The heart

is within normal limits in size.



The lungs are clear. No pleural effusion is seen.



Several surgical clips project in the left upper quadrant in the region of the

gastroesophageal junction.



IMPRESSION:  POSTSURGICAL CHANGES, NO EVIDENCE FOR ACUTE FINDING.

## 2017-04-25 NOTE — RAD
INDICATION:  Headache with blurry vision.



COMPARISON:  Comparison is made with a prior CT of the brain from April 04, 2017.



TECHNIQUE: Contiguous axial sections of the brain were obtained from the skull base to the

vertex without contrast.



FINDINGS: The patient is status post right frontal craniotomy and clipping of an aneurysm.

There are surgical clips present at the skull base on the right side adjacent to the

cavernous sinus and sella turcica.



The ventricles, cisterns and sulci are mildly prominent consistent with mild atrophy.

There is a focal area of encephalomalacia in the right frontal and anterior right temporal

lobes which is unchanged. No other focal abnormality or mass effect is present. No

hemorrhage is seen.



There is contrast present from a recent prior CT of the chest which was performed

approximately one hour ago.



The visualized portion of the paranasal sinuses and mastoid air cells appear clear.



IMPRESSION:  POSTSURGICAL CHANGES MOST CONSISTENT WITH A PRIOR ANEURYSM CLIPPING, NO

EVIDENCE FOR ACUTE FINDING.

## 2017-04-25 NOTE — HP
HISTORY AND PHYSICAL:

 

DATE OF ADMISSION:  17

 

PRIMARY CARE PROVIDER:  Dr. Dixon.

 

ATTENDING PHYSICIAN WHILE IN THE HOSPITAL:  Ellis Mendes MD *(report dictated 
by Av Clark NP)

 

CHIEF COMPLAINT:

1.  Headaches.

2.  Blurry vision.

 

HISTORY OF PRESENT ILLNESS:  Ms. Aviles is a 77-year-old female patient coming 
into the ER today with complaints of blurry vision and headache.  She states 
that last evening she had headache, it was 4/10, mild headache, but she was 
having some blurry vision.  She thinks it lasted for about an hour, both eyes 
blurry, it went away.  She went to bed.  She woke up again this morning, she 
had a worsening headache, not the worst of her life, on the top of her head, 
front of her head, but she also was having blurry vision.  It did eventually go 
away.  She said her vision never truly returned to her baseline according to 
her and she was concerned.  She told her visiting nurse this and the visiting 
nurse said that she might want to go to the hospital to evaluate it, so she was 
sent to the hospital and was evaluated here.  She denies specifically having 
any changes in speech. Denies having any worsening with speech, denies any 
facial droop.  Denies any weakness to one side or the other.  Denies any 
dizziness.  Denies having any trouble with gait.  She does of interest, was 
recently diagnosed on  with extensive right lower extremity DVT by her 
primary and was started on Eliquis which she has been taking and not missing 
any doses.  She was here on 17, one week after having had a CABG on 

17 over in [place] and was here for chest pain rule out and had a CTA 
at that point, which was negative.  She comes back today again mostly because 
of the blurry vision, headache.  She denied any worsening chest pain.  Denies 
having any shortness of breath.  Denies having any difficulty with taking a 
deep breath.  She said she has been doing well and said her activity level had 
not changed.  She was evaluated in the ED.  She had a CAT scan which was 
negative of the brain.  No acute findings.  There was an aneurysmal clipping 
which she has had for sometime and unfortunately, though she did have another 
CTA of the chest today which did now show PE's in the lungs.  Because of these 
findings, we were asked to evaluate for admission.

 

PAST MEDICAL HISTORY:  Significant for:

1.  CAD.

2.  Bezoar.

3.  Osteoarthritis.

4.  Gastroparesis.

5.  Diabetes.

6.  Carpal tunnel syndrome.

7.  Insomnia.

8.  Hyperlipidemia.

9.  Hypertension.

10.  Diabetic neurological disorders.

11.  DVT.

 

PAST SURGICAL HISTORY:

1.  She has had CABG.

2.  Hernia repair.

3.   x4.

4.  Nissen fundoplication.

5.  Brain aneurysm repair.

6.  Laparoscopic cholecystectomy.

7.  Cataract extraction.

8.  Appendectomy.

9.  Knee arthroscopy.

 

HOME MEDICATIONS:  According to the list that she provided include:

1.  Toprol-XL 25 mg p.o. daily.

2.  Reglan 5 mg p.o. t.i.d. as needed.

3.  Apixaban 5 mg p.o. b.i.d.

4.  Lipitor 20 mg p.o. at bedtime.

5.  Glucophage 1000 mg p.o. b.i.d.

6.  Starlix 60 mg p.o. t.i.d. with meals.

7.  Mag Ox 400 mg p.o. daily.

8.  Norco 1 to 2 tablets every 4 hours as needed.

9.  Aspirin 81 mg p.o. daily.

 

ALLERGIES:  To medications include PENICILLIN and DAPAGLIFLOZIN, an epidural.

 

FAMILY HISTORY:  Her mother had a history of PE and father had a history of 
diabetes.

 

SOCIAL HISTORY:  She is a former smoker, no longer smoking.  She does not drink 
alcohol.  Surrogate decision maker is her daughter, Diane.

 

REVIEW OF SYSTEMS:  There is no documented fever.  She denies having any 
significant weight change.  No double vision.  No ear discharge.  No 
rhinorrhea. No sore throat, no thyroid enlargement.  She denies any chest pain 
currently.  She denies having any orthopnea.  No  nocturnal dyspnea.  There is 
no abdominal pain. No nausea, no vomiting, no dysuria.  No frequency.  No loss 
of consciousness.  No pruritus.  No skin ulcerations.  Review of 14 systems 
completed, all others negative.

 

                               PHYSICAL EXAMINATION

 

GENERAL:  At this time, Mrs. Aviles is a 77-year-old female patient.  She is 
sitting in the ER stretcher.  She does not appear to be in any acute distress.

 

VITAL SIGNS:  Reveals blood pressure 146/86, pulse 79, respirations 20, O2 sat 
98%, temperature 98.0.

 

HEENT:  Head is atraumatic and normocephalic.  Eyes:  EOMs are intact.  Sclerae 
anicteric and not pale.  Throat:  Oral mucosa appears to be moist.  No 
oropharyngeal erythema.

 

NECK:  Supple.

 

LUNGS:  Clear to auscultation bilaterally.  There was no wheezes, rales, or 
rhonchi noted.

 

HEART:  Sounds S1, S2.  Regular rate and rhythm.  No murmurs, rubs, or gallops.

 

ABDOMEN:  Soft, flat, nontender.  Bowel sounds present.

 

EXTREMITIES:  Pulses were 2+ throughout.  She is able to move all 4 extremities 
with 5/5 strength.

 

NEUROLOGIC:  The patient is awake, she is alert, speech is clear.  Cranial 
nerves II through XII are intact.   are equal.  No facial drooping.  Heel-
to-shin intact bilaterally.  Finger-to-nose intact bilaterally.  No gross focal 
deficits.

 

SKIN:  Intact with the exception that she has a well-healing incision to the 
midline sternum which is clean, dry, and intact.  No erythema noted.  No 
discharge.

 

 LABORATORY DATA:  Labs today revealed WBC 7.2, RBC 3.97, hemoglobin 10.8, 
hematocrit 34, platelet count of 280.  INR is 1.52.  PTT 27.4.  D-dimer greater 
than 1075.  Sodium 134, potassium 4.7, chloride of 101, bicarb 24, BUN 12, 
creatinine 0.97, glucose 99, lactic 2.4, calcium 9.4, mag 1.8, total bili 0.4, 
AST 15, ALT 11, alk phos 86.  CK 17, CK-MB 1.5, troponin 0.01, , TSH low 
at 0.29.  Urine obtained showed trace 1+ blood, 1+ wbc,1+ rbc, present squamous 
epithelial cells.  



She had multiple imaging here in the ED, starting out with chest x-ray which 
revealed postsurgical changes, no evidence of acute findings.  



She had a brain CT which revealed postsurgical changes most consistent with 
prior aneurysm clipping, no evidence for acute finding.  



Chest, thorax CTA showed multiple pulmonary emboli involving the right main 
pulmonary artery and right upper lobe segmental artery branches, small 
pericardial effusion unchanged.  



She had an EKG obtained today as well which showed sinus tachycardia, rate of 
101 with a PVC.  It is reviewed with the previous EKG, appears to be similar 
with the exception of the ST depression.  The previous EKG appears to be 
slightly improved to this EKG and the heart rate is not assessed.  



Old medical records reviewed.

 

ASSESSMENT AND PLAN:  Ms. Aviles is a 77-year-old female patient coming into the 
ER today with complaints of headache and blurry vision, and on evaluation found 
to have pulmonary embolism. She will be admitted  under inpatient status for:

 

1.  Pulmonary embolism.  At this point, the patient was on Eliquis for this.  
It is going to be difficult to prove that this was an Eliquis failure or not.  
She certainly was diagnosed with the deep venous thrombosis on the  of this 
month. She could have had the pulmonary embolism then, but we will not know.  
She had a CTA on the  which was completely negative.  I think at this point, 
it will be considered an Eliquis failure. I did touch base with Dr. Resendez, he 
was in agreement with this and we will go ahead and switch her on to Xarelto.  
She did get a dose of Lovenox tonight, so I will start the Xarelto in the 
morning at 15 mg b.i.d. for 21 days and then followed by once daily.  She will 
be placed on telemetry and we will repeat an echo.

2.  Headache with blurry vision.  Again, this could be atypical migraine.  It 
could certainly be possible embolic stroke, but again the management of which 
would not change in terms of she would still need to be on anticoagulation.  
The CT of the brain is negative.  Unfortunately, she cannot have an MRI.  I 
think at this point we need to get a CTA of the head and neck because of the 
history of the aneurysm to make sure this is stable.  There was no report of 
subarachnoid bleeding. Unfortunately, she cannot have a spinal tap because she 
was given Lovenox in the ED and she has already been on Eliquis, so I think we 
will get a CT of the head and neck.  We will also get another echo with a 
bubble study because she has a patent foramen ovale and there is a high 
likelihood she could have certainly embolized and this could be causing the 
visual changes, although I could not elicit this on exam and I did get a 
neurology consult and we will continue to follow.

3.  Coronary artery disease.  Continue the aspirin, statin, beta-blocker.

4.  History of gastroparesis.  P.r.n. Reglan as ordered.

5.  Diabetes.  She will be on lispro sliding scale.

6.  Carpal tunnel syndrome.  Follow up with primary.

7.  Hypertension.  Continue her beta-blocker.

8.  Hyperlipidemia.  Continue statin therapy.

9.  Osteoarthritis.  Follow up with primary.

10.  DVT prophylaxis.  She did get a stat Lovenox tonight.  She will be placed 
on Xarelto tomorrow morning.

11.  Code status.  Full code.

12.  Fluids, electrolytes, nutrition.  She can have a heart healthy diet.

 

TIME SPENT:  Time spent on this admission was 70 minutes, greater than half the 
time was spent face-to-face with the patient obtaining my history and physical, 
and the other half time was spent going over the plan of care with patient and 
implementing plan of care.

 

I did discuss the plan of care with my attending, Dr. Mendes, he is in 
agreement.

 

 ____________________________________ AV CLARK NP



CC:  Dr. Resendez; Dr. Dixon; Dr. Hoang; Dr. Owusu *

 

[id number]/[facility] #: [id number]

MTDD

## 2017-04-25 NOTE — RAD
INDICATION:  Currently has DVT, shortness of breath.



COMPARISON: Comparison is made with a prior CT angiogram of the chest from April 04, 2017.

Correlation is also made with a chest x-ray study from April 25, 2017. 



TECHNIQUE: A CT angiogram of the chest was performed with intravenous following

intravenous injection of 69 ml of Visipaque 320 nonionic contrast. Contiguous axial

sections were obtained from the lung apices through the lung bases. Images were

reconstructed in the coronal and sagittal planes.



FINDINGS: There is thrombus in the right main pulmonary artery extending into right upper

lobe branches. No other intraluminal filling defects are seen. These findings are new from

the prior study.



The heart appears mildly enlarged and unchanged. The patient is status post coronary

artery bypass surgery. There is a small pericardial effusion which is unchanged.  The

thoracic aorta is normal in caliber and demonstrates homogeneous contrast opacification.



No significant enlarged mediastinal or hilar lymph nodes are seen.



There is mild dependent bilateral lower lobe subsegmental atelectasis. The lungs are

otherwise clear. No pleural effusion is present.



No significant focal osseous abnormality is seen.



IMPRESSION: 

1. MULTIPLE PULMONARY EMBOLI INVOLVING THE RIGHT MAIN PULMONARY ARTERY AND RIGHT UPPER

LOBE SEGMENTAL ARTERY BRANCHES.

2. SMALL PERICARDIAL EFFUSION, UNCHANGED.

## 2017-04-26 LAB
ANION GAP SERPL CALC-SCNC: 7 MMOL/L (ref 2–11)
BUN SERPL-MCNC: 10 MG/DL (ref 6–24)
BUN/CREAT SERPL: 15.4 (ref 8–20)
CALCIUM SERPL-MCNC: 8.8 MG/DL (ref 8.6–10.3)
CHLORIDE SERPL-SCNC: 104 MMOL/L (ref 101–111)
GLUCOSE SERPL-MCNC: 98 MG/DL (ref 70–100)
HCO3 SERPL-SCNC: 25 MMOL/L (ref 22–32)
HCT VFR BLD AUTO: 32 % (ref 35–47)
HGB BLD-MCNC: 10.1 G/DL (ref 12–16)
MCH RBC QN AUTO: 27 PG (ref 27–31)
MCHC RBC AUTO-ENTMCNC: 32 G/DL (ref 31–36)
MCV RBC AUTO: 85 FL (ref 80–97)
POTASSIUM SERPL-SCNC: 3.9 MMOL/L (ref 3.5–5)
RBC # BLD AUTO: 3.71 10^6/UL (ref 4–5.4)
SODIUM SERPL-SCNC: 136 MMOL/L (ref 133–145)
WBC # BLD AUTO: 5.5 10^3/UL (ref 3.5–10.8)

## 2017-04-26 RX ADMIN — SODIUM CHLORIDE SCH MLS/HR: 900 IRRIGANT IRRIGATION at 11:59

## 2017-04-26 RX ADMIN — ENOXAPARIN SODIUM SCH MG: 100 INJECTION SUBCUTANEOUS at 18:40

## 2017-04-26 RX ADMIN — MAGNESIUM OXIDE TAB 400 MG (241.3 MG ELEMENTAL MG) SCH MG: 400 (241.3 MG) TAB at 07:54

## 2017-04-26 RX ADMIN — ATORVASTATIN CALCIUM SCH MG: 20 TABLET, FILM COATED ORAL at 20:03

## 2017-04-26 RX ADMIN — ACETAMINOPHEN PRN MG: 325 TABLET ORAL at 05:18

## 2017-04-26 RX ADMIN — ACETAMINOPHEN PRN MG: 325 TABLET ORAL at 15:42

## 2017-04-26 RX ADMIN — WARFARIN SODIUM SCH MG: 5 TABLET ORAL at 18:39

## 2017-04-26 RX ADMIN — METOPROLOL SUCCINATE SCH MG: 25 TABLET, EXTENDED RELEASE ORAL at 07:54

## 2017-04-26 RX ADMIN — INSULIN LISPRO SCH: 100 INJECTION, SOLUTION INTRAVENOUS; SUBCUTANEOUS at 07:48

## 2017-04-26 RX ADMIN — SODIUM CHLORIDE SCH MLS/HR: 900 IRRIGANT IRRIGATION at 20:24

## 2017-04-26 RX ADMIN — INSULIN LISPRO SCH: 100 INJECTION, SOLUTION INTRAVENOUS; SUBCUTANEOUS at 16:56

## 2017-04-26 RX ADMIN — SODIUM CHLORIDE SCH MLS/HR: 900 IRRIGANT IRRIGATION at 05:15

## 2017-04-26 RX ADMIN — INSULIN LISPRO SCH UNITS: 100 INJECTION, SOLUTION INTRAVENOUS; SUBCUTANEOUS at 12:18

## 2017-04-26 RX ADMIN — ACETAMINOPHEN PRN MG: 325 TABLET ORAL at 00:48

## 2017-04-26 RX ADMIN — ASPIRIN SCH MG: 81 TABLET, COATED ORAL at 07:54

## 2017-04-26 NOTE — CONS
NEUROLOGY CONSULTATION:

 

DATE OF CONSULTATION:  04/26/17

 

REFERRING PROVIDER:  Av Clark NP.

 

PRIMARY CARE PROVIDER:  Evan Dixon MD.

 

LOCATION:  She is an inpatient in room 431.

 

CHIEF COMPLAINT:  Headache, blurry vision.

 

HISTORY OF PRESENT ILLNESS:  Atilio Aviles is a 77-year-old right-handed 
woman who has been having headaches for at least a month.  She states that she 
has been having headaches in the left side of her head and wakes up with them 
every morning at about 3, 4 or 5.  They persist throughout the day and respond 
to Tylenol partially, but not completely.  They are precipitated by neck 
movement and she has been going to massage therapist and she feels that that 
has been helpful.  They are always in the left posterolateral head and neck.  
She has also noted blurry vision when she turns her head.  She states that has 
also been going on for probably a month or more.  She had history of migraine 
headaches when she was young and she does not describe a visual aura but just 
nausea, photophobia and severe sonophobia such that she would have to lie down 
in a dark quiet room.  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 
1994 while she was recuperating from what sounds to have been sepsis and multi-
organ failure.  She states since then, she did not have any headaches until 
about a year ago when these new ones came on.

 

She continued to have bad headaches yesterday and blurry vision and somehow I 
believe communicated with her primary care or coverage.  She has advised that 
she be evaluated in the emergency room.  She was diagnosed as having DVT and 
was put on Eliquis approximately 3 weeks ago.  As part of her evaluation in the 
emergency room last night, she had a CT angiogram of the chest which revealed 
new pulmonary emboli, which were not present on a prior CT angiogram of the 
chest earlier this month.

 

PAST MEDICAL HISTORY:  Notable for diabetes, carpal tunnel syndrome, 
hypertension, hyperlipidemia, coronary artery disease, right cerebral aneurysm 
clipped after a rupture in 1994, remote history of migraines, DVT diagnosed 
this past month.

 

PAST SURGICAL HISTORY:  Notable for coronary artery bypass surgery, 
herniorrhaphies, Nissen fundoplication, cerebral aneurysm clipping, several 
orthopedic surgeries.

 

MEDICATIONS AT THE TIME OF ADMISSION:  Consist of:

1.  Reglan 5 mg p.o. t.i.d. p.r.n. gastroparesis.

2.  Toprol XL 25 mg p.o. every day.

3.  Apixaban 5 mg p.o. b.i.d.

4.  Glucophage 1000 mg p.o. b.i.d.

5.  Lipitor 20 mg p.o. every day.

6.  Starlix 60 mg p.o. t.i.d. p.c.

7.  Norco 1 tablet every 4 hours, which she states she does not take as it 
makes her too tired.

8.  Aspirin 81 mg p.o. every day.

9.  Tylenol 325 mg p.o. p.r.n. headache.

 

ALLERGIES:  She is allergic to PENICILLIN and DAPAGLIFLOZIN.

 

FAMILY HISTORY:  Notable for mother having pulmonary embolism, no history of 
intracranial hemorrhages.

 

REVIEW OF SYSTEMS:  Notable for persistent neck pain.  She has occasional back 
pain.  No falls.  No fevers or chills.  She denies shortness of breath or chest 
pain.  She has ongoing headache, which she states is better today than it was 
yesterday.  No history of head trauma.  No history of seizures or epilepsy.  
She believe she recovered from her cerebral hemorrhage without weakness, but 
does not remember the details very well.

 

PHYSICAL EXAMINATION:  She is afebrile, last temperature 98.1 orally, blood 
pressure 136/59, heart rate 72 and regular, respirations 18.  Oxygen saturation 
is 100% on room air.

 

Neck range of motion is somewhat limited with left posterolateral pain, 
particularly with right lateral flexion and left lateral rotation, but to a 
lesser extent flexion.  There is a little bit of radiation to the left shoulder 
and anterior chest muscles with neck movement.  Heart is in a regular rate and 
rhythm without murmurs heard.  Carotid pulses are present and no bruits.  Lungs 
are clear anterolaterally.

 

Neurologic exam, the pupils are very small, at most 2 mm, reacting weakly to 
light. There is no ptosis.  Fundi are poorly seen, but a brief looks at her 
optic discs appear sharp.  Eye movements are normal and visual fields are full 
to confrontation.  Facial musculature symmetric.  Facial sensation to light 
touch is symmetric.  Palate and tongue appeared normal and palate rises 
symmetrically. There is no dysarthria.  Hearing is intact.  Neck muscle bulk 
appears normal.

 

Motor exam reveals a left pronator drift.  She has normal strength proximally 
and distally in the upper extremities and lower extremities otherwise.

 

Finger taps are a little slow in the left hand, but otherwise normal 
bilaterally. There is no rest tremor.

 

Reflexes are intact and symmetric.  Plantar responses are equivocal 
bilaterally.  I did not attempt to ambulate her.

 

She is alert and oriented, and a good detailed historian.  Memory is intact and 
language is fluent.  She has good attention, concentration, and fund of 
knowledge.

 

DIAGNOSTIC STUDIES/LABORATORY DATA:  Revealed includes a transthoracic 
echocardiogram today which did not reveal an atrial septal defect.  CT 
angiogram of the head and neck did not reveal extracranial or intracranial 
stenosis, but did reveal artifact from a right, either middle cerebral artery 
or anterior communicating artery aneurysm clip.  There is encephalomalacia on 
her brain CT in the right medial temporal and frontal area.

 

Other laboratory data notable for unremarkable CBC other than a hemoglobin of 
10.8, coags notable for INR 1.52 yesterday and 1.32 today.  D-dimer greater 
than 1050. Chemistries notable for glucose 115 and 202 yesterday and today.  
Lactic acid 2.4 yesterday, chemistries otherwise unremarkable. Troponin 
yesterday 0.01.  CRP on 04/25/17 normal at 3.0.

 

IMPRESSION:  Her headache is probably cervicogenic.  It has been there for at 
least a month and seems to be precipitated by neck movement.  It does not 
appear to be related to her current presentation for pulmonary emboli, other 
than that pulmonary emboli can cause or aggravate pre-existing headaches.

 

The visual changes appear also to be related to her headache and there is no 
evidence of extracranial or intracranial vascular stenosis.  I will just treat 
her headaches symptomatically with Tylenol.  I suggested trying amitriptyline, 
but after I mentioned dry mouth, she said she would rather not try that.  She 
is interested in seeing a physical therapist for her neck; however, and I think 
that would be a reasonable thing to undertake as an outpatient.

 

 [id number]/[facility] #: [id number]

[place]

## 2017-04-26 NOTE — CONS
MEDICAL ONCOLOGY/HEMATOLOGY CONSULTATION NOTE:

 

DATE OF CONSULT:  17

 

REASON FOR CONSULT:  Pulmonary embolism.

 

HISTORY OF PRESENT ILLNESS:  Atilio Aviles is a 77-year-old female, whose 
relevant history dates back to 17 when she underwent a 4-vessel CABG in 
[place], [place].  She reports she was hospitalized for 4 days and did well 
at that time.  She does not remember having any injections for blood thinners 
during that hospitalization.  She subsequently was found to be lightheaded and 
dizzy and profusely sweating.  She was urged by the visiting nurse to come to 
the hospital and was admitted on 17.  There were no palpitations or loss 
of consciousness at that time.  She did have some nausea and vomiting.  CTA of 
the chest was performed and even when looked at in retrospect did not reveal 
any evidence for pulmonary emboli.  Etiology of that episode was never fully 
elucidated.  She was discharged to home on 17 and felt this was most 
likely dehydration.  Transesophageal echocardiogram revealed ejection fraction 
of 55% to 60% with abnormal left ventricular diastolic function.  She did well 
from then until 17, when she was found to have swelling in her leg.  She 
presented to the emergency room and had a Doppler study performed.  This 
revealed an extensive right lower extremity occlusive DVT extending as far 
proximal as the proximal segment of the femoral vein.  She was started on 
Eliquis and reports she was taking her Eliquis daily on full schedule since 
that period of time.

 

She presents now with blurry vision and headaches.  She reports that she 
typically is getting headaches every morning for the past year, waking her 
about 4:30 or 5 in the morning and waking her up and on some days will have it 
throughout the day, but other days, it will then resolve.  The headache that 
she had the night before that admission was the worst headache she has had to 
this point being 8/10 and she has had some headaches intermittently since.  
Along with the headache and blurred vision, she has just not felt well.  She 
denies any shortness of breath, any significant chest pain.  She has remained 
reasonably active until coming into the emergency room.  CT scan was performed 
of the brain and was unremarkable showing aneurysm clipping.  CTA of the chest 
revealed very extensive pulmonary emboli involving mostly the right main 
pulmonary artery and the right upper lobe segmental artery.  There was no clot 
seen at all more distally.  She received a dose of Lovenox in the emergency 
room.  She then was started on Xarelto this morning at 15 mg with a plan for 
b.i.d. dosing.  When I see her today, she reports that she is not particularly 
having any new symptoms, although she does continue to have the headaches, 
although not as severe as on admission.

 

PAST MEDICAL HISTORY:  Coronary artery disease, with CABG as discussed above; 
hyperlipidemia; hypertension; diabetes; DVT recently; associated with the 
diabetes, she does have some gastroparesis and a resultant bezoar.

 

PAST SURGICAL HISTORY:

1.  Status post CABG x4, 2017.

2.  Status post brain aneurysm repair, , with severe headaches until then 
and then none until the last year.

3.  Laparoscopic cholecystectomy.

4.  Nissen fundoplication.

5.  Herniorrhaphy.

6.   x4.

7.  Cataract surgery.

8.  Appendectomy.

9.  Arthroscopy of the knee.

 

MEDICATIONS:  At the time of admission included:

1.  Toprol-XL 25 mg daily.

2.  Reglan 5 mg t.i.d. p.r.n.

3.  Eliquis/apixaban 5 mg b.i.d.

4.  Lipitor 20 mg at h.s.

5.  Glucophage 1000 mg b.i.d.

6.  Starlix 60 mg t.i.d.

7.  Magnesium oxide 400 mg daily.

8.  Norco 1 to 2 tablets q.4 hours p.r.n. pain.

9.  Aspirin 81 mg daily.

 

ALLERGIES:  To PENICILLIN and to EPIDURAL MEDICATION, which she believes was 
DAPAGLIFLOZIN.

 

FAMILY HISTORY:  Mother with pulmonary embolism in her 30s and then dying 
suddenly at age 48 from presumed PE.  Sister with multiple DVTs and PEs in her 
lifetime, first was at age 28.  She has had 5 to 6 episodes, several of these 
have occurred when she was on Coumadin which she remained on lifelong after age 
27 until she was switched to Eliquis 6 months ago.  She believes that she was 
therapeutic at the time of some of her pulmonary emboli.  Has 4 children, 1 son 
dying of pancreatic cancer, other 3 children in good health.

 

SOCIAL HISTORY:  Smoked as a teenager only.  Has not smoked since.  No alcohol. 
Her daughter lives with her.  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

 

REVIEW OF SYSTEMS:  No recent signs of infection.  No significant change in 
appetite or weight.  Neurologic symptoms as discussed above with the headaches 
and vision.  No chest pain or shortness of breath.  No abdominal pain.  No 
significant change in bowel or bladder habits.  Review of systems otherwise 
negative except as discussed above.

 

PHYSICAL EXAM:  A 77-year-old female in no acute distress.  Vital Signs:  Blood 
pressure 136/59, pulse 73, afebrile.  HEENT:  PERRL, EOMI.  No erythema or 
exudates.  No palpable cervical, supraclavicular, or axillary adenopathy.  Lungs
: Clear.  Heart:  Regular rate and rhythm without murmurs, rubs, or gallops. 
Abdomen:  Soft, nontender without masses or organomegaly.  Extremities:  No 
clubbing, cyanosis, or edema.  Neurologic exam is without focal deficits.

 

LABORATORY DATA:  Include INR of 1.52 and a PTT of 27.4.  CBC with a white 
count of 7200, hemoglobin 10.8, hematocrit 34, platelet count 280,000.  D-dimer 
is positive, greater than 1075.  Chemistry studies without significant 
abnormalities.  Troponin 0.01.  CK-MB of 1.5.

 

IMPRESSION:  Pulmonary embolism occurring while on Eliquis.  She did not have 
any pulmonary emboli seen on CTA of the chest performed approximately 3 weeks 
ago.  She did develop documented extensive deep venous thrombosis on 17.  
She now has nonspecific symptoms and is found to have extensive pulmonary 
emboli in the right main pulmonary artery and proximally, there is absolutely 
no clot seen distally, and absolutely no clot seen on the prior CTA.  This 
pattern would suggest that her pulmonary embolism has occurred fairly recently 
and is therefore likely that has occurred or progressed while on Eliquis.  I 
would recommend given her progression on this medication and also given her 
strong family history that she be switched to another class of medications.  
Xarelto is also a factor Xa inhibitor and therefore would not likely be the 
best choice.  The best options would either be Coumadin with an initial Lovenox 
until therapeutic INR or Lovenox injections chronically. The patient refuses 
chronic Lovenox injections.  Another alternative although likely not as good a 
choice would be to use another of the new novel oral anticoagulants, which 
works  by different mechanism.  Dabigatran is that medication, but although 
theoretically, this should work even in Eliquis failure, there is no literature 
to support this.  I therefore suggested to the patient and her sister who has 
had the multiple deep venous thrombosis and pulmonary embolism in the past and 
who is present  with her at the time of my discussion that she should try 
Lovenox initially with bridging to Coumadin and then to be followed for INRs 
lifelong.  Given her family history of multiple deep venous thrombosis and 
pulmonary embolism and given the fact that she had had a presumed failure to 
Eliquis, I would recommend lifelong anticoagulation for her.



CC:  Dr. Evan Dixon; Dr. Owusu; Dr. Resendez *

 

 [id number]/[facility] #: [id number]

[facility]

## 2017-04-27 VITALS — SYSTOLIC BLOOD PRESSURE: 144 MMHG | DIASTOLIC BLOOD PRESSURE: 67 MMHG

## 2017-04-27 RX ADMIN — METOPROLOL SUCCINATE SCH MG: 25 TABLET, EXTENDED RELEASE ORAL at 08:25

## 2017-04-27 RX ADMIN — ENOXAPARIN SODIUM SCH MG: 100 INJECTION SUBCUTANEOUS at 08:24

## 2017-04-27 RX ADMIN — INSULIN LISPRO SCH: 100 INJECTION, SOLUTION INTRAVENOUS; SUBCUTANEOUS at 12:40

## 2017-04-27 RX ADMIN — INSULIN LISPRO SCH: 100 INJECTION, SOLUTION INTRAVENOUS; SUBCUTANEOUS at 08:17

## 2017-04-27 RX ADMIN — ACETAMINOPHEN PRN MG: 325 TABLET ORAL at 04:08

## 2017-04-27 RX ADMIN — MAGNESIUM OXIDE TAB 400 MG (241.3 MG ELEMENTAL MG) SCH MG: 400 (241.3 MG) TAB at 08:25

## 2017-04-27 RX ADMIN — SODIUM CHLORIDE SCH MLS/HR: 900 IRRIGANT IRRIGATION at 04:08

## 2017-04-27 RX ADMIN — WARFARIN SODIUM SCH MG: 5 TABLET ORAL at 17:19

## 2017-04-27 RX ADMIN — ACETAMINOPHEN PRN MG: 325 TABLET ORAL at 13:23

## 2017-04-27 RX ADMIN — ASPIRIN SCH MG: 81 TABLET, COATED ORAL at 08:25

## 2017-04-27 RX ADMIN — INSULIN LISPRO SCH UNITS: 100 INJECTION, SOLUTION INTRAVENOUS; SUBCUTANEOUS at 13:24

## 2017-04-28 NOTE — ED
Blas GAONA Billy, scribed for Raul Groves MD on 04/25/17 at 1512 .





Complex/Multi-Sys Presentation





- HPI Summary


HPI Summary: 


Patient is a 77 year-old female coming to [facility] for evaluation of headache 

since last night. She states that she had 4x CABG surgery in early March of 

this year, and was also recently diagnosed with DVT, for which she is taking 

Eliquis. She took a hydrocodone at 0600 today for the headache, without 

improvement. She also reports feeling dizzy, nauseated, chills, and blurred 

vision. She states she also feels sore in the shoulders anteriorly, although 

she was told by her surgeon that this is quite normal after chest surgery. 

Otherwise, she has no new onset of chest pain. Denies blood in the stool. 

Denies any recent weight gain.





- History Of Current Complaint


Chief Complaint: EDHeadache


Time Seen by Provider: 04/25/17 14:56


Hx Obtained From: Patient


Onset/Duration: Gradual Onset, Lasting Hours, Still Present


Timing: Constant


Severity Currently: Moderate


Severity Initially: Moderate


Location: Pain At: - headache


Character: Typical Headache


Aggravating Factor(s): none


Alleviating Factor(s): none


Associated Signs And Symptoms: Positive: Dizziness, Nausea, Other - chills, 

blurred vision





- Allergies/Home Medications


Allergies/Adverse Reactions: 


 Allergies











Allergy/AdvReac Type Severity Reaction Status Date / Time


 


Penicillins Allergy Unknown See Comment Verified 04/25/17 14:03


 


Dapagliflozin [From Farxiga] Allergy  Unknown Verified 04/25/17 14:03





   Reaction  





   Details  


 


EPIDURAL Allergy  PT STATES Uncoded 10/28/16 06:18





   SHE GOT TO  





   MUCH  











Home Medications: 


 Home Medications





Atorvastatin* [Lipitor 20 MG*] 20 mg PO BEDTIME 04/25/17 [History Confirmed 04/ 25/17]


Metoprolol Succinate XL TAB* [Toprol XL TAB*] 25 mg PO DAILY 04/25/17 [History 

Confirmed 04/25/17]











PMH/Surg Hx/FS Hx/Imm Hx


Endocrine/Hematology History: Reports: Hx Diabetes - type 2


   Denies: Hx Thyroid Disease


Cardiovascular History: Reports: Hx Hypertension


   Denies: Other Cardiovascular Problems/Disorders


Respiratory History: Reports: Hx Sleep Apnea - NOT DIAGNOSED


   Denies: Hx Asthma, Hx Chronic Obstructive Pulmonary Disease (COPD)


GI History: Reports: Hx Hiatal Hernia - SURGICALLY REPAIRED 1993


   Denies: Hx Ulcer, Other GI Disorders


Musculoskeletal History: Reports: Hx Arthritis - RIGHT KNEE, Hx Rheumatoid 

Arthritis


   Denies: Hx Osteoporosis, Other Musculoskeletal History


Sensory History: Reports: Hx Cataracts - ROMERO, Hx Contacts or Glasses - GLASSES


   Denies: Hx Hearing Aid


Opthamlomology History: Reports: Hx Cataracts - ROMERO, Hx Contacts or Glasses - 

GLASSES


Neurological History: Reports: Hx Migraine - PAST ONLY, Other Neuro Impairments/

Disorders - BRAIN ANEURYSM 1994 WITH METAL CLIP


Psychiatric History: Reports: Hx Anxiety - MILD, NO MEDS





- Surgical History


Surgery Procedure, Year, and Place: 4 c sections, gallbladder removed, many 

hernias, eye sugery


Hx Anesthesia Reactions: Yes - N/V





- Immunization History


Date of Tetanus Vaccine: Unk


Date of Influenza Vaccine: Fall 2015


Infectious Disease History: No


Infectious Disease History: 


   Denies: Hx Hepatitis, Hx Human Immunodeficiency Virus (HIV), Traveled 

Outside the [place] in Last 30 Days





- Family History


Known Family History: Positive: Other - Negative: osteoporosis, breast cancer, 

malignant hyperthermia





- Social History


Alcohol Use: None


Hx Substance Use: No


Substance Use Type: Reports: Prescribed


Hx Tobacco Use: No


Smoking Status (MU): Never Smoked Tobacco


Have You Smoked in the Last Year: No





Review of Systems


Positive: Chills.  Negative: Fever


Positive: Blurred Vision.  Negative: Erythema


Negative: Sore Throat


Negative: Chest Pain


Negative: Shortness Of Breath, Cough


Positive: Nausea.  Negative: Abdominal Pain, Vomiting


Negative: dysuria, hematuria


Negative: Myalgia, Edema


Negative: Rash


Neurological: Other - dizzy


Positive: Headache


All Other Systems Reviewed And Are Negative: Yes





Physical Exam





- Summary


Physical Exam Summary: 


Constitutional: Well-developed, Well-nourished, Alert. (-) Distressed


Skin: Warm, Dry.  Intact vein graft incisions in the left leg. 


HENT: Normocephalic; Atraumatic


Eyes: Conjunctiva normal


Neck: Musculoskeletal ROM normal neck. (-) JVD, (-) Stridor, (-) Tracheal 

deviation


Cardio: Rhythm regular, rate normal, Heart sounds normal; Intact distal pulses; 

The pedal pulses are 2+ and symmetric. Radial pulses are 2+ and symmetric.  (-) 

Murmur


Pulmonary/Chest wall: Effort normal. (-) Respiratory distress, (-) Wheezes, (-) 

Rales


Abd: Soft, (-) Tenderness,  (-) Distension, (-) Guarding, (-) Rebound


Musculoskeletal: (-) Edema


Lymph: (-) Cervical adenopathy


Neuro: Alert, Oriented x3


Psych: Mood and affect Normal


Triage Information Reviewed: Yes


Vital Signs On Initial Exam: 


 Initial Vitals











Temp Pulse Resp BP Pulse Ox


 


 97.6 F   107   20   135/61   98 


 


 04/25/17 12:54  04/25/17 12:54  04/25/17 12:54  04/25/17 12:54  04/25/17 12:54











Vital Signs Reviewed: Yes





- [place] Coma Scale


Coma Scale Total: 15





Diagnostics





- Vital Signs


 Vital Signs











  Temp Pulse Resp BP Pulse Ox


 


 04/25/17 14:00   102  16  98/55  94


 


 04/25/17 13:34   107  18  115/84  93


 


 04/25/17 13:31  98 F  103  17  115/84  94


 


 04/25/17 13:25   101  17   94


 


 04/25/17 12:54  97.6 F  107  20  135/61  98














- Laboratory


Lab Results: 


 Lab Results











  04/25/17 04/25/17 04/25/17 Range/Units





  15:23 15:23 15:23 


 


WBC  7.2    (3.5-10.8)  10^3/ul


 


RBC  3.97 L    (4.0-5.4)  10^6/ul


 


Hgb  10.8 L    (12.0-16.0)  g/dl


 


Hct  34 L    (35-47)  %


 


MCV  86    (80-97)  fL


 


MCH  27    (27-31)  pg


 


MCHC  32    (31-36)  g/dl


 


RDW  15    (10.5-15)  %


 


Plt Count  280    (150-450)  10^3/ul


 


MPV  7 L    (7.4-10.4)  um3


 


Neut % (Auto)  69.2    (38-83)  %


 


Lymph % (Auto)  18.1 L    (25-47)  %


 


Mono % (Auto)  9.0    (1-9)  %


 


Eos % (Auto)  2.7    (0-6)  %


 


Baso % (Auto)  1.0    (0-2)  %


 


Absolute Neuts (auto)  5.0    (1.5-7.7)  10^3/ul


 


Absolute Lymphs (auto)  1.3    (1.0-4.8)  10^3/ul


 


Absolute Monos (auto)  0.6    (0-0.8)  10^3/ul


 


Absolute Eos (auto)  0.2    (0-0.6)  10^3/ul


 


Absolute Basos (auto)  0.1    (0-0.2)  10^3/ul


 


Absolute Nucleated RBC  0.01    10^3/ul


 


Nucleated RBC %  0.1    


 


INR (Anticoag Therapy)   1.52 H   (0.89-1.11)  


 


APTT   27.4   (26.0-36.3)  seconds


 


D-Dimer, Quantitative   > 1050 H   (Less Than 230)  ng/mL


 


Sodium    134  (133-145)  mmol/L


 


Potassium    4.7  (3.5-5.0)  mmol/L


 


Chloride    101  (101-111)  mmol/L


 


Carbon Dioxide    24  (22-32)  mmol/L


 


Anion Gap    9  (2-11)  mmol/L


 


BUN    12  (6-24)  mg/dL


 


Creatinine    0.70  (0.51-0.95)  mg/dL


 


Est GFR ( Amer)    104.1  (>60)  


 


Est GFR (Non-Af Amer)    80.9  (>60)  


 


BUN/Creatinine Ratio    17.1  (8-20)  


 


Glucose    99  ()  mg/dL


 


Lactic Acid     (0.5-2.0)  mmol/L


 


Calcium    9.4  (8.6-10.3)  mg/dL


 


Magnesium    1.8 L  (1.9-2.7)  mg/dL


 


Total Bilirubin    0.40  (0.2-1.0)  mg/dL


 


AST    15  (13-39)  U/L


 


ALT    11  (7-52)  U/L


 


Alkaline Phosphatase    86  ()  U/L


 


Total Creatine Kinase    17  ()  U/L


 


CK-MB (CK-2)    1.5  (0.6-6.3)  ng/mL


 


Troponin I    0.01  (<0.04)  ng/mL


 


C-Reactive Protein    3.00  (< 5.00)  mg/L


 


B-Natriuretic Peptide    ( - 100) pg/mL


 


Total Protein    6.9  (6.4-8.9)  g/dL


 


Albumin    3.7  (3.2-5.2)  g/dL


 


Globulin    3.2  (2-4)  g/dL


 


Albumin/Globulin Ratio    1.2  (1-3)  


 


Lipase    21  (11.0-82.0)  U/L


 


TSH    0.29 L  (0.34-5.60)  mcIU/mL


 


Urine Color     


 


Urine Appearance     


 


Urine pH     (5-9)  


 


Ur Specific Gravity     (1.010-1.030)  


 


Urine Protein     (Negative)  


 


Urine Ketones     (Negative)  


 


Urine Blood     (Negative)  


 


Urine Nitrate     (Negative)  


 


Urine Bilirubin     (Negative)  


 


Urine Urobilinogen     (Negative)  


 


Ur Leukocyte Esterase     (Negative)  


 


Urine WBC (Auto)     (Absent)  


 


Urine RBC (Auto)     (Absent)  


 


Ur Squamous Epith Cells     (Absent)  


 


Urine Bacteria     (Absent)  


 


Urine Glucose     (Negative)  














  04/25/17 04/25/17 04/25/17 Range/Units





  15:23 15:23 15:44 


 


WBC     (3.5-10.8)  10^3/ul


 


RBC     (4.0-5.4)  10^6/ul


 


Hgb     (12.0-16.0)  g/dl


 


Hct     (35-47)  %


 


MCV     (80-97)  fL


 


MCH     (27-31)  pg


 


MCHC     (31-36)  g/dl


 


RDW     (10.5-15)  %


 


Plt Count     (150-450)  10^3/ul


 


MPV     (7.4-10.4)  um3


 


Neut % (Auto)     (38-83)  %


 


Lymph % (Auto)     (25-47)  %


 


Mono % (Auto)     (1-9)  %


 


Eos % (Auto)     (0-6)  %


 


Baso % (Auto)     (0-2)  %


 


Absolute Neuts (auto)     (1.5-7.7)  10^3/ul


 


Absolute Lymphs (auto)     (1.0-4.8)  10^3/ul


 


Absolute Monos (auto)     (0-0.8)  10^3/ul


 


Absolute Eos (auto)     (0-0.6)  10^3/ul


 


Absolute Basos (auto)     (0-0.2)  10^3/ul


 


Absolute Nucleated RBC     10^3/ul


 


Nucleated RBC %     


 


INR (Anticoag Therapy)     (0.89-1.11)  


 


APTT     (26.0-36.3)  seconds


 


D-Dimer, Quantitative     (Less Than 230)  ng/mL


 


Sodium     (133-145)  mmol/L


 


Potassium     (3.5-5.0)  mmol/L


 


Chloride     (101-111)  mmol/L


 


Carbon Dioxide     (22-32)  mmol/L


 


Anion Gap     (2-11)  mmol/L


 


BUN     (6-24)  mg/dL


 


Creatinine     (0.51-0.95)  mg/dL


 


Est GFR ( Amer)     (>60)  


 


Est GFR (Non-Af Amer)     (>60)  


 


BUN/Creatinine Ratio     (8-20)  


 


Glucose     ()  mg/dL


 


Lactic Acid  2.4 H*    (0.5-2.0)  mmol/L


 


Calcium     (8.6-10.3)  mg/dL


 


Magnesium     (1.9-2.7)  mg/dL


 


Total Bilirubin     (0.2-1.0)  mg/dL


 


AST     (13-39)  U/L


 


ALT     (7-52)  U/L


 


Alkaline Phosphatase     ()  U/L


 


Total Creatine Kinase     ()  U/L


 


CK-MB (CK-2)     (0.6-6.3)  ng/mL


 


Troponin I     (<0.04)  ng/mL


 


C-Reactive Protein     (< 5.00)  mg/L


 


B-Natriuretic Peptide   111 H  ( - 100) pg/mL


 


Total Protein     (6.4-8.9)  g/dL


 


Albumin     (3.2-5.2)  g/dL


 


Globulin     (2-4)  g/dL


 


Albumin/Globulin Ratio     (1-3)  


 


Lipase     (11.0-82.0)  U/L


 


TSH     (0.34-5.60)  mcIU/mL


 


Urine Color    Yellow  


 


Urine Appearance    Clear  


 


Urine pH    6.0  (5-9)  


 


Ur Specific Gravity    1.013  (1.010-1.030)  


 


Urine Protein    Negative  (Negative)  


 


Urine Ketones    Negative  (Negative)  


 


Urine Blood    1+ H  (Negative)  


 


Urine Nitrate    Negative  (Negative)  


 


Urine Bilirubin    Negative  (Negative)  


 


Urine Urobilinogen    Negative  (Negative)  


 


Ur Leukocyte Esterase    Trace H  (Negative)  


 


Urine WBC (Auto)    1+(6-10/hpf) H  (Absent)  


 


Urine RBC (Auto)    1+(3-5/hpf) H  (Absent)  


 


Ur Squamous Epith Cells    Present H  (Absent)  


 


Urine Bacteria    Absent  (Absent)  


 


Urine Glucose    Negative  (Negative)  











Result Diagrams: 


 04/26/17 05:27





 04/26/17 05:27


Lab Statement: Any lab studies that have been ordered have been reviewed, and 

results considered in the medical decision making process.





- Radiology


  ** CXR


Xray Interpretation: No Acute Changes


Radiology Interpretation Completed By: Radiologist





- CT


  ** CTA chest


CT Interpretation Completed By: Radiologist - 1. MULTIPLE PULMONARY EMBOLI 

INVOLVING THE RIGHT MAIN PULMONARY ARTERY AND RIGHT UPPER LOBE SEGMENTAL ARTERY 

BRANCHES. 2. SMALL PERICARDIAL EFFUSION, UNCHANGED.





  ** Brain


CT Interpretation: No Acute Changes


CT Interpretation Completed By: Radiologist





- EKG


  ** 1335


EKG Interpretation: sinus tachycardia 101 bpm, no STEMI





  ** 1303


EKG Interpretation: sinus tachycardia 105 bpm, no STEMI





Complex Multi-Symp Course/Dx


Assessment/Plan: 77 year-old female comes to the ED for evaluation of headache. 

Patient recently had 4x CABG and was evaluated for DVT, and is being treated 

with Eliquis. However, CTA chest shows multiple pulmonary emboli involving the 

right main pulmonary artery and right upper lobe segmental artery branches. CT 

brain shows no acute changes. CXR shows no acute changes. EKG is unremarkable. 

Lactic acid is 2.4. In the ED course, patient was hydrated with IV fluids and 

given Lovenox. Patient care was discussed with Av Clark, who admits the 

patient to the hospitalist services.





- Diagnoses


Provider Diagnoses: 


 Pulmonary embolism








- Physician Notifications


Discussed Care Of Patient With: Av Clark NP (hospitalist) at [phone]





- Critical Care Time


Critical Care Time: 30-74 min - 45 minutes





Discharge





- Discharge Plan


Condition: Stable


Disposition: ADMITTED TO [facility] documentation as recorded by the Blas ruiz Billy accurately reflects the 

service I personally performed and the decisions made by me, Raul Groves MD.

## 2017-04-28 NOTE — DS
DISCHARGE SUMMARY:

 

DATE OF ADMISSION:  04/25/17

 

DATE OF DISCHARGE:  04/27/17

 

ADMISSION DIAGNOSES:

1.  Pulmonary embolism.

2.  Headache with blurry vision.

3.  Coronary artery disease.

4.  Gastroparesis.

5.  Diabetes.

6.  Carpal tunnel syndrome.

7.  Hypertension.

8.  Hyperlipidemia.

9.  Osteoarthritis.

 

DISCHARGE DIAGNOSES:

1.  Pulmonary embolism.

2.  Headache with blurry vision.

3.  Coronary artery disease.

4.  Gastroparesis.

5.  Diabetes.

6.  Carpal tunnel syndrome.

7.  Hypertension.

8.  Hyperlipidemia.

9.  Osteoarthritis.

 

HOSPITAL COURSE:  The patient is a 77-year-old woman who presented to [facility] with a chief complaint of blurry vision and headache.  The 
patient also had a recent diagnosis of a DVT, which she was started on Eliquis 
for about 1 week prior.  She had not missed any doses.  For unclear reason, the 
patient had a CTA of her chest, which actually did show a pulmonary embolism.  
Hematology was consulted and recommended the patient be switched over to 
Coumadin, although it was unclear if it was an Eliquis failure.  They felt 
fairly sure that it was.  The patient was started on Lovenox and Coumadin and 
was willing to take her own Lovenox shots and follow up with her PCP next week.
  The patient does have visiting nurse services who will check her INR at home.
  The patient did have a headache with blurry vision when she arrived, but the 
blurry vision dissipated on its own.  She says she has been waking up every 
morning with a headache for the last year.  We tried multiple medications 
including Fioricet, tramadol, and Norco.  Her headache was somewhat better the 
next morning, but it was unclear why.  It was recommended that possibly she 
will follow up with a neurologist as an outpatient for further evaluation of 
these headaches.  The patient was stable and anxious to go home on 04/27/17.

 

PHYSICAL EXAMINATION:  On the date of discharge, temperature 97.9 degrees, 
heart rate 100 beats per minutes, respiratory rate 12 breaths per minute, pulse 
ox 97% on room air, and blood pressure 144/67.  HEENT:  Normocephalic, 
atraumatic.  Pupils equal, round and reactive to light.  Moist mucous 
membranes.  Neck:  Supple.  No JVD, bruits, palpable thyroid, or 
lymphadenopathy. Chest: Clear to auscultation and percussion bilaterally.  
Cardiovascular Exam: S1, S2 appreciated.  Regular rate and rhythm.  Abdominal 
Exam:  Positive bowel sounds in all 4 quadrants.  Soft, nontender, and 
nondistended.  Extremities:  No cyanosis, clubbing, or edema; +2 peripheral 
pulses bilaterally.  Neuro:  Alert and oriented x3.  Moves all extremities.  
Skin:  No rashes or abnormalities.

 

STUDIES DONE WHILE IN THE HOSPITAL: Chest x-ray, 04/25/17, impression:  
Postsurgical changes.  No evidence for acute finding.

 

CTA of the chest, 04/25/17, impression:  Multiple pulmonary emboli including 
the right main pulmonary artery and right upper lobe segment pulmonary artery 
branches, small pericardial effusion unchanged.

 

Brain CT, 04/25/17, impression:  Postsurgical changes most consistent with 
prior aneurysmal clipping.  No evidence for acute finding.

 

Head CTA, 04/25/17, impression:  No evidence of hemodynamically significant 
carotid stenosis.  No evidence for recurrent aneurysm.  Study limited due to 
artifact from aneurysm clips.

 

Transthoracic echocardiogram, 04/25/17, impression:  Mild concentric left 
ventricular hypertrophy observed, global left ventricular wall motion and 
contractility within normal limits.  Estimated ejection fraction 55% to 60%, 
abnormal left ventricular diastolic function was observed consistent with 
impaired relaxation.  The right ventricle was mild-to-moderately dilated.  The 
right ventricle global systolic function is mildly reduced.  A patent foramen 
ovale is not demonstrated by agitated contrast. Systolic excursion of the right 
coronary cusp is reduced and mobile. There is borderline aortic stenosis 
present and mild aortic regurgitation.  There is mild mitral regurgitation, 
mild mitral stenosis, and moderate tricuspid regurgitation.  Evidence of 
pulmonary hypertension may be under estimated at 31 mmHg.  Compared with prior 
echo of 04/04/17, left ventricular function is stable.  Right ventricular 
hypokinesis is new.  The degree of TR has increased with mild PA pressure 
previously 48 mmHg.

 

DISCHARGE MEDICATIONS:

1.  Metoprolol succinate 25 mg daily.

2.  Metoclopramide 5 mg 3 times a day as needed.

3.  Atorvastatin 20 mg at bedtime.

4.  Metformin 1000 mg twice daily.

5.  Starlix 60 mg 3 times a day.

6.  Magnesium oxide tab 100 mg daily.

7.  Norco 5/325 mg 1 to 2 tabs every 4 hours as needed for pain.

8.  Aspirin 81 mg daily.

9.  Coumadin 5 mg daily at 5 p.m.

10.  Lovenox 70 mg subcu q.12 hours.

11.  Fioricet tab 1 tab every 4 hours as needed.

 

DISCHARGE PLAN:  The patient will be discharged home.  She will have her INR 
checked next week with the visiting nurse service and Dr. Dixon will follow up 
on this and give her directions as to the Lovenox and Coumadin dosing.  The 
patient is to return to the ED if she develops any worrisome symptoms.

 

She should also follow up with Dr. Dixon within 1 week.

 

TIME SPENT:  Over 50 minutes was spent on this discharge; more than 35 minutes 
of which was spent in direct face-to-face contact with the patient, evaluation, 
physical exam, counseling, and coordination of care.



CC:  Evan Dixon MD *

 

 [id number]/[facility] #: [id number]

SARAVANAN

## 2017-11-17 ENCOUNTER — HOSPITAL ENCOUNTER (EMERGENCY)
Dept: HOSPITAL 25 - ED | Age: 77
LOS: 1 days | Discharge: HOME | End: 2017-11-18
Payer: MEDICARE

## 2017-11-17 ENCOUNTER — HOSPITAL ENCOUNTER (EMERGENCY)
Dept: HOSPITAL 25 - UCEAST | Age: 77
Discharge: ANOTHER HOSPITAL | End: 2017-11-17
Payer: MEDICARE

## 2017-11-17 VITALS — SYSTOLIC BLOOD PRESSURE: 146 MMHG | DIASTOLIC BLOOD PRESSURE: 82 MMHG

## 2017-11-17 DIAGNOSIS — Z79.01: ICD-10-CM

## 2017-11-17 DIAGNOSIS — L03.119: Primary | ICD-10-CM

## 2017-11-17 DIAGNOSIS — M25.462: ICD-10-CM

## 2017-11-17 DIAGNOSIS — M25.562: ICD-10-CM

## 2017-11-17 DIAGNOSIS — M25.562: Primary | ICD-10-CM

## 2017-11-17 DIAGNOSIS — R11.0: ICD-10-CM

## 2017-11-17 DIAGNOSIS — M54.32: ICD-10-CM

## 2017-11-17 DIAGNOSIS — I82.409: ICD-10-CM

## 2017-11-17 DIAGNOSIS — I26.99: ICD-10-CM

## 2017-11-17 LAB
ALBUMIN SERPL BCG-MCNC: 3.8 G/DL (ref 3.2–5.2)
ALP SERPL-CCNC: 75 U/L (ref 34–104)
ALT SERPL W P-5'-P-CCNC: 12 U/L (ref 7–52)
ANION GAP SERPL CALC-SCNC: 7 MMOL/L (ref 2–11)
AST SERPL-CCNC: 14 U/L (ref 13–39)
BUN SERPL-MCNC: 16 MG/DL (ref 6–24)
BUN/CREAT SERPL: 20.8 (ref 8–20)
CALCIUM SERPL-MCNC: 8.8 MG/DL (ref 8.6–10.3)
CHLORIDE SERPL-SCNC: 99 MMOL/L (ref 101–111)
GLOBULIN SER CALC-MCNC: 2.8 G/DL (ref 2–4)
GLUCOSE SERPL-MCNC: 355 MG/DL (ref 70–100)
HCO3 SERPL-SCNC: 24 MMOL/L (ref 22–32)
POTASSIUM SERPL-SCNC: 3.9 MMOL/L (ref 3.5–5)
PROT SERPL-MCNC: 6.6 G/DL (ref 6.4–8.9)
SODIUM SERPL-SCNC: 130 MMOL/L (ref 133–145)

## 2017-11-17 PROCEDURE — G0463 HOSPITAL OUTPT CLINIC VISIT: HCPCS

## 2017-11-17 PROCEDURE — 99212 OFFICE O/P EST SF 10 MIN: CPT

## 2017-11-17 PROCEDURE — 85610 PROTHROMBIN TIME: CPT

## 2017-11-17 PROCEDURE — 99283 EMERGENCY DEPT VISIT LOW MDM: CPT

## 2017-11-17 PROCEDURE — 83605 ASSAY OF LACTIC ACID: CPT

## 2017-11-17 PROCEDURE — 80053 COMPREHEN METABOLIC PANEL: CPT

## 2017-11-17 PROCEDURE — 36415 COLL VENOUS BLD VENIPUNCTURE: CPT

## 2017-11-17 PROCEDURE — 86140 C-REACTIVE PROTEIN: CPT

## 2017-11-17 PROCEDURE — 85025 COMPLETE CBC W/AUTO DIFF WBC: CPT

## 2017-11-17 NOTE — RAD
Indication: LEFT knee pain and swelling without proceeding injury.



Comparison: No relevant prior exams available on the [facility] PACS for comparison.



Technique: LEFT knee: AP, tunnel, lateral, sunrise views. 



Report: Small suprapatellar joint effusion. Bone density appears decreased throughout.

Negative for fracture or malalignment. Minimal chondrocalcinosis at the medial and lateral

menisci. Peripheral vascular calcifications. Medial surgical clips. Mild nonfocal soft

tissue swelling.



IMPRESSION: Minimal chondrocalcinosis at the menisci consistent with degeneration. Small

joint effusion. Negative for fracture.

## 2017-11-17 NOTE — UC
Knee Pain HPI





- HPI Summary


HPI Summary: 


LEFT KNEE WAS FEELING A BIT ACHY YESTERDAY. TODAY WOKE UP WITH SEVERE PAIN, CAN 

BARELY WALK ON IT. IS RED, HOT AND SWOLLEN. VERY RESTRICTED ROM. NO PREVIOUS 

INJURY TO LEFT KNEE. HAD VEIN HARVESTED FROM LLE FOR CARDIAC SURGERY. DENIES 

ANY INJURY OR TRAUMA. HAS NOT BEEN KNEELING, SQUATTING, DOING HEAVY LIFTING OR 

STRENUOUS ACTIVITIES. HAS BEEN IN PHYSICAL THERAPY FOR SCIATICA LEFT LEG. 

DISCHARGED 11/7/17. THIS PAIN FEELS DIFFERENT. DENIES FEVER. PT IS ON COUMADIN 

FOR RLE DVT/PE. 





- History of Current Complaint


Chief Complaint: UCLowerExtremity


Stated Complaint: KNEE PAIN


Time Seen by Provider: 11/17/17 18:40


Hx Obtained From: Patient, Family/Caretaker - DAUGHTER


Onset/Duration: Gradual Onset, Lasting Hours, Still Present


Severity Initially: Moderate


Severity Currently: Severe


Pain Intensity: 10


Pain Scale Used: 0-10 Numeric


Character: Sharp, Aching, Throbbing


Aggravating Factor(s): Movement, Weight Bearing


Alleviating Factor(s): Nothing


Associated Signs And Symptoms: Positive: Swelling, Redness


Able to Bear Weight: Yes - WITH EXTREME PAIN





- Allergies/Home Medications


Allergies/Adverse Reactions: 


 Allergies











Allergy/AdvReac Type Severity Reaction Status Date / Time


 


Penicillins Allergy Unknown See Comment Verified 11/17/17 18:38


 


Dapagliflozin [From Farxiga] Allergy  Unknown Verified 11/17/17 18:38





   Reaction  





   Details  


 


EPIDURAL Allergy  PT STATES Uncoded 10/28/16 06:18





   SHE GOT TO  





   MUCH  











Home Medications: 


 Home Medications





Acetaminophen [Mapap] 1,000 mg PO PRN 11/17/17 [History]


Gabapentin CAP(*) [Neurontin 400 mg CAP(*)] 400 mg PO QID 11/17/17 [History 

Confirmed 11/17/17]


Warfarin TAB(*) [Coumadin TAB(*)] 2.5 mg PO DAILY PRN 11/17/17 [History 

Confirmed 11/17/17]


Warfarin TAB(*) [Coumadin TAB(*)] 5 mg PO DAILY PRN 11/17/17 [History Confirmed 

11/17/17]











PMH/Surg Hx/FS Hx/Imm Hx


Cardiovascular History: Cardiac Disease, Deep Vein Thrombosis - RLE DVT/PE


Other Neurological History: SCIATICA





- Surgical History


Surgical History: Yes


Surgery Procedure, Year, and Place: 4 c sections, gallbladder removed, many 

hernias, eye sugery, CABG X4 MARCH 2017





- Family History


Known Family History: Positive: Hypertension, Other - Negative: osteoporosis, 

breast cancer, malignant hyperthermia





- Social History


Alcohol Use: None


Substance Use Type: Prescribed


Smoking Status (MU): Never Smoked Tobacco


Have You Smoked in the Last Year: No





- Immunization History


Most Recent Influenza Vaccination: 2016


Most Recent Pneumonia Vaccination: has received in the past





Review of Systems


Constitutional: Negative


Skin: Other - ERYTHEMA LEFT KNEE


Respiratory: Negative


Cardiovascular: Negative


Gastrointestinal: Negative


Musculoskeletal: Arthralgia, Decreased ROM, Edema


All Other Systems Reviewed And Are Negative: Yes





Physical Exam


Triage Information Reviewed: Yes


Appearance: Well-Appearing, Well-Nourished, Pain Distress - MOD


Vital Signs: 


 Initial Vital Signs











Temp  98.1 F   11/17/17 18:31


 


Pulse  105   11/17/17 18:31


 


Resp  20   11/17/17 18:31


 


BP  146/82   11/17/17 18:31


 


Pulse Ox  100   11/17/17 18:31











Vital Signs Reviewed: Yes


Eyes: Positive: Conjunctiva Clear


ENT: Positive: Hearing grossly normal


Neck: Positive: Supple


Respiratory: Positive: No respiratory distress, No accessory muscle use


Cardiovascular: Positive: Pulses Normal


Abdomen Description: Positive: Soft


Musculoskeletal: Positive: ROM Limited @ - LEFT KNEE, Edema @ - LEFT KNEE, Other

: - TTP DIFFUSELY OVER LEFT KNEE. WORSE ANTEROMEDIALLY. SWOLLEN JOINT. WARM TO 

TOUCH. LIGAMENTS INTACT. SIGNIFICANTLY DECREASED ROM. KNEE EXAM LIMITED DUE TO 

PAIN.


Neurological: Positive: Alert


Psychological: Positive: Normal Response To Family, Age Appropriate Behavior


Skin: Positive: Other - LEFT KNEE ERYTHEMA.  Negative: rashes





Diagnostics





- Radiology


  ** LEFT KNEE XRAY


Xray Interpretation: Positive (See Comments) - Minimal chondrocalcinosis at the 

menisci consistent with degeneration. Small joint effusion. Negative for 

fracture


Radiology Interpretation Completed By: Radiologist





Knee Pain Course/Dx





- Course


Course Of Treatment: SEND TO ED FOR FURTHER EVAL. NEED TO R/O SEPTIC JOINT.





- Differential Dx/Diagnosis


Provider Diagnoses: LEFT KNEE PAIN/SWELLING





Discharge





- Discharge Plan


Condition: Stable


Disposition: OTHER


Discharge Disposition Comment: TO [facility] ED BY PRIVATE CAR


Patient Education Materials:  Swollen Knee Joint (ED)


Referrals: 


Evan Dixon MD [Primary Care Provider] - If Needed


Additional Instructions: 


CONCERN FOR SEPTIC JOINT. GO DIRECTLY TO [facility] ED FROM HERE FOR FURTHER 

EVALUATION.

## 2017-11-18 VITALS — SYSTOLIC BLOOD PRESSURE: 136 MMHG | DIASTOLIC BLOOD PRESSURE: 72 MMHG

## 2017-11-18 LAB
HCT VFR BLD AUTO: 33 % (ref 35–47)
HGB BLD-MCNC: 10.7 G/DL (ref 12–16)
MCH RBC QN AUTO: 27 PG (ref 27–31)
MCHC RBC AUTO-ENTMCNC: 32 G/DL (ref 31–36)
MCV RBC AUTO: 83 FL (ref 80–97)
RBC # BLD AUTO: 4.02 10^6/UL (ref 4–5.4)
WBC # BLD AUTO: 9.4 10^3/UL (ref 3.5–10.8)

## 2017-11-18 NOTE — ED
Ramiro GAONA Alfonso, scribed for Magdaleno Santiago MD on 11/18/17 at 0008 .





Lower Extremity





- HPI Summary


HPI Summary: 


 This patient is [facility] 77 [facility] 

accompanied by daughter with a chief complaint of left knee pain worse since 

0300 today. She reports a CABG from which LLE veins were used. The patient 

rates the pain 10/10 in severity. Symptoms aggravated by movement, and 

palpations. Symptoms alleviated by nothing. Patient reports swelling, erythema, 

and nausea (when eating due to DM). Patient denies fever, and vomiting. Patient 

is on Coumadin.





- History of Current Complaint


Chief Complaint: EDExtremityLower


Stated Complaint: LT KNEE SWOLLEN


Hx Obtained From: Patient


Onset of Pain: Prior to Arrival


Onset/Duration: Still Present


Severity Currently: Severe


Pain Intensity: 10


Pain Scale Used: 0-10 Numeric


Timing: Constant


Location: Is Discrete @ - left knee


Aggravating Factor(s): Movement, Other - palpation


Alleviating Factor(s): Nothing





- Allergies/Home Medications


Allergies/Adverse Reactions: 


 Allergies











Allergy/AdvReac Type Severity Reaction Status Date / Time


 


Penicillins Allergy Unknown See Comment Verified 11/17/17 21:10


 


Dapagliflozin [From Farxiga] Allergy  Unknown Verified 11/17/17 21:10





   Reaction  





   Details  


 


EPIDURAL Allergy  PT STATES Uncoded 11/17/17 21:10





   SHE GOT TO  





   MUCH  














PMH/Surg Hx/FS Hx/Imm Hx


Endocrine/Hematology History: Reports: Hx Diabetes - type 2


   Denies: Hx Thyroid Disease


Cardiovascular History: Reports: Hx Hypertension


   Denies: Other Cardiovascular Problems/Disorders


Respiratory History: Reports: Hx Sleep Apnea - NOT DIAGNOSED


   Denies: Hx Asthma, Hx Chronic Obstructive Pulmonary Disease (COPD)


GI History: Reports: Hx Hiatal Hernia - SURGICALLY REPAIRED 1993


   Denies: Hx Ulcer, Other GI Disorders


Musculoskeletal History: Reports: Hx Arthritis - RIGHT KNEE, Hx Rheumatoid 

Arthritis


   Denies: Hx Osteoporosis, Other Musculoskeletal History


Sensory History: Reports: Hx Cataracts - ROMERO, Hx Contacts or Glasses - GLASSES


   Denies: Hx Hearing Aid


Opthamlomology History: Reports: Hx Cataracts - ROMERO, Hx Contacts or Glasses - 

GLASSES


Neurological History: Reports: Hx Migraine - PAST ONLY, Other Neuro Impairments/

Disorders - BRAIN ANEURYSM 1994 WITH METAL CLIP


Psychiatric History: Reports: Hx Anxiety - MILD, NO MEDS





- Surgical History


Surgery Procedure, Year, and Place: 4 c sections, gallbladder removed, many 

hernias, eye sugery, CABG X4 MARCH 2017


Hx Anesthesia Reactions: Yes - N/V





- Immunization History


Date of Tetanus Vaccine: Unk


Date of Influenza Vaccine: Fall 2015


Immunizations Up to Date: Yes


Infectious Disease History: No


Infectious Disease History: 


   Denies: Hx Hepatitis, Hx Human Immunodeficiency Virus (HIV), Traveled 

Outside the [place] in Last 30 Days





- Family History


Known Family History: Positive: Hypertension, Other - Arthritis; neg 

osteoporosis, breast cancer, malignant hyperthermia





- Social History


Alcohol Use: None


Hx Substance Use: No


Substance Use Type: Reports: Prescribed


Hx Tobacco Use: No


Smoking Status (MU): Never Smoked Tobacco


Have You Smoked in the Last Year: No





Review of Systems


Negative: Fever


Positive: Nausea.  Negative: Vomiting


Positive: Edema, Other - left knee pain


Positive: Other - erythema


All Other Systems Reviewed And Are Negative: Yes





Physical Exam





- Summary


Physical Exam Summary: 





Appearance: Well-appearing, Well-nourished


Sin: Warm


Eyes: Normal


ENT: Normal 


Neck: Supple, nontender


Respiratory: Clear to auscultation


Cardiovascular: Normal


Abdomen: Soft, nontender


Bowel: Present


Musculoskeletal: Left knee: Diffuse anterior swelling and minimal redness. 

Tender to palpation. Tenderness along the borders of the patella. Minimally 

tenderness along the left vein harvest site at left lower thigh. Patient is 

able to actively range left knee from approximately 170 to 90 degrees. Normal 

ROM at feet and ankles bilaterally. Soft compartments of the thighs and calfs 

bilateral.


Neurological: Normal, A&Ox3


Psychiatric: Normal


Triage Information Reviewed: Yes


Vital Signs On Initial Exam: 


 Initial Vitals











Temp Pulse Resp BP Pulse Ox


 


 97.9 F   121   16   139/86   96 


 


 11/17/17 21:04  11/17/17 21:04  11/17/17 21:04  11/17/17 21:04  11/17/17 21:04











Vital Signs Reviewed: Yes





- [place] Coma Scale


Coma Scale Total: 15





Diagnostics





- Vital Signs


 Vital Signs











  Temp Pulse Resp BP Pulse Ox


 


 11/17/17 23:30   103   125/68  96


 


 11/17/17 23:20   103    94


 


 11/17/17 23:18     131/69 


 


 11/17/17 21:04  97.9 F  121  16  139/86  96














- Laboratory


Lab Results: 


 Lab Results











  11/17/17 11/17/17 11/17/17 Range/Units





  22:50 22:50 23:35 


 


WBC    9.4  (3.5-10.8)  10^3/ul


 


RBC    4.02  (4.0-5.4)  10^6/ul


 


Hgb    10.7 L  (12.0-16.0)  g/dl


 


Hct    33 L  (35-47)  %


 


MCV    83  (80-97)  fL


 


MCH    27  (27-31)  pg


 


MCHC    32  (31-36)  g/dl


 


RDW    16 H  (10.5-15)  %


 


Plt Count    302  (150-450)  10^3/ul


 


MPV    8  (7.4-10.4)  um3


 


Neut % (Auto)    72.6  (38-83)  %


 


Lymph % (Auto)    12.9 L  (25-47)  %


 


Mono % (Auto)    13.0 H  (1-9)  %


 


Eos % (Auto)    0.5  (0-6)  %


 


Baso % (Auto)    1.0  (0-2)  %


 


Absolute Neuts (auto)    6.8  (1.5-7.7)  10^3/ul


 


Absolute Lymphs (auto)    1.2  (1.0-4.8)  10^3/ul


 


Absolute Monos (auto)    1.2 H  (0-0.8)  10^3/ul


 


Absolute Eos (auto)    0.1  (0-0.6)  10^3/ul


 


Absolute Basos (auto)    0.1  (0-0.2)  10^3/ul


 


Absolute Nucleated RBC    0  10^3/ul


 


Nucleated RBC %    0  


 


INR (Anticoag Therapy)   2.05 H   (0.89-1.11)  


 


Sodium  130 L    (133-145)  mmol/L


 


Potassium  3.9    (3.5-5.0)  mmol/L


 


Chloride  99 L    (101-111)  mmol/L


 


Carbon Dioxide  24    (22-32)  mmol/L


 


Anion Gap  7    (2-11)  mmol/L


 


BUN  16    (6-24)  mg/dL


 


Creatinine  0.77    (0.51-0.95)  mg/dL


 


Est GFR ( Amer)  93.2    (>60)  


 


Est GFR (Non-Af Amer)  72.5    (>60)  


 


BUN/Creatinine Ratio  20.8 H    (8-20)  


 


Glucose  355 H    ()  mg/dL


 


Calcium  8.8    (8.6-10.3)  mg/dL


 


Total Bilirubin  0.50    (0.2-1.0)  mg/dL


 


AST  14    (13-39)  U/L


 


ALT  12    (7-52)  U/L


 


Alkaline Phosphatase  75    ()  U/L


 


C-Reactive Protein  26.00 H    (< 5.00)  mg/L


 


Total Protein  6.6    (6.4-8.9)  g/dL


 


Albumin  3.8    (3.2-5.2)  g/dL


 


Globulin  2.8    (2-4)  g/dL


 


Albumin/Globulin Ratio  1.4    (1-3)  











Result Diagrams: 


 11/17/17 23:35





 11/17/17 22:50


Lab Statement: Any lab studies that have been ordered have been reviewed, and 

results considered in the medical decision making process.





- Additional Comments


Diagnostic Additional Comments: 


Venous Doppler Study reveals, per radiologist, No DVT. ED physician has 

reviewed this radiology report and agrees.





Re-Evaluation





- Re-Evaluation


  ** First Eval


Re-Evaluation Time: 03:44


Change: Improved


Comment: No fever or unconstitutional symptoms. Feels slightly improves after 

fluids and abx. Unchanged ROM in knee.





Lower Extremity Course/Dx





- Course


Assessment/Plan: Given the patient clinical appearance, and H&P, I have aa 

every low suspicion for septic arthritis. Pt has relatively intact ROM and no 

fever, elevation WBC, or constitution symptoms. Pain is likely due to overlying 

cellulitis which in the present of which an arthrocentesis is contraindicated. 

Pain more likely from combination of cellulitis and baker cyst as per history. 

Instructed continue abx for cellulitis and follow up with PCP and orthopedics 

within 1-2 weeks and return to the ED for worsening or returning sx. Patient 

and family understand and agree to d/c instructions.





- Diagnoses


Provider Diagnoses: 


 Cellulitis, Knee pain








Discharge





- Discharge Plan


Condition: Improved


Disposition: HOME


Prescriptions: 


Clindamycin HCl [Clindamycin 150 MG CAP*] 150 mg PO QID #28 cap


Misc. Devices [Walker/Folding Luis Daniel] 1 mis XX DAILY #1 mis


Patient Education Materials:  Cellulitis (ED), Bakers Cyst (ED), Knee Pain (ED)

, Swollen Knee Joint (ED)


Referrals: 


Evan Dixon MD [Primary Care Provider] - 2 Days


Tracey De Dios MD [Medical Doctor] - 


Additional Instructions: 


RETURN TO THE EMERGENCY DEPARTMENT FOR CHANGING OR WORSENING SYMPTOMS.


PLEASE MAKE AN APPOINTMENT TO SEE YOUR PRIMARY CARE DOCTOR TO BE SEEN WITHIN 1 

WEEK.





The documentation as recorded by the Ramiro ruiz Alfonso accurately reflects 

the service I personally performed and the decisions made by me, Magdaleno Santiago MD.

## 2017-11-18 NOTE — RAD
HISTORY: Left leg pain and edema



TECHNIQUE: Multiple transverse and longitudinal ultrasound images were obtained of the

veins of the right lower extremity using grayscale, color Doppler, and spectral Doppler

imaging with and without compression and with augmentation. 



FINDINGS:



VEINS: The common femoral vein, deep femoral vein, femoral vein and popliteal vein are

compressible throughout their course, with normal flow on color Doppler imaging and normal

response to augmentation on spectral Doppler imaging.



SOFT TISSUES: Grossly normal. No large popliteal fossa cyst was identified.



IMPRESSION: 

No sonographic evidence of deep vein thrombosis.

## 2018-02-06 NOTE — HP
HISTORY AND PHYSICAL:

 

DATE OF ADMISSION/SURGERY:  02/15/18

 

DATE OF OFFICE VISIT:  18

 

SURGEON:  Tracey De Dios MD * (DICTATED BY KAMARI GOVEA)

 

PROCEDURE:  Left knee arthroscopy with partial medial meniscectomy, possible 
chondroplasty, possible synovectomy.

 

CHIEF COMPLAINT:  Left knee pain.

 

HISTORY OF PRESENT ILLNESS:  Ms. Aviles is a 78-year-old female with complaints 
of left knee pain and MRI confirms the medial meniscus tear and she has elected 
to proceed with surgery, which is scheduled for 02/15/18 with

Dr. De Dios.

 

PAST MEDICAL HISTORY:  DVT, coronary artery disease, diabetes, hypertension, 
and high cholesterol.

 

PAST SURGICAL HISTORY:  CABG, , trigger finger release, cholecystectomy
, and appendectomy.

 

CURRENT MEDICATIONS:

1.  Warfarin sodium 5 mg.

2.  Metoclopramide 5 mg.

3.  Atorvastatin calcium 20 mg q.h.s.

4.  Metoprolol 25 mg every day.

5.  Metformin 1000 mg twice a day.

6.  Nateglinide 60 mg 1 tab before meals.

7.  Magnesium 400 mg a day.

8.  Insulin.

9.  Nitroglycerin 0.4 mg.

10.  Clobetasol propionate cream as needed.

11. Tylenol as needed.

 

ALLERGIES:  PENICILLIN, FARXIGA, and EPIDURAL.

 

FAMILY HISTORY:  Diabetes and blood clots.

 

SOCIAL HISTORY:  She is a 78-year-old female.  She lives with her daughter. She 
does not smoke or use drugs or alcohol.

 

REVIEW OF SYSTEMS:  A complete 14-point review of systems was reviewed with the 
patient and was positive for history of diabetes, DVT and she had a severe 
reaction to a prior epidural.

 

                               PHYSICAL EXAMINATION

 

GENERAL:  She is well developed, well nourished, in no acute distress.

 

VITAL SIGNS:  She stands 62 inches tall, weighs 152 pounds, her blood pressure 
is 142/63, heart rate is 82.

 

HEENT:  Normocephalic, atraumatic.

 

NECK:  Supple.  No palpable nodes.

 

PULMONARY:  The lungs are clear to auscultation bilaterally.

 

CARDIO:  Regular rate and rhythm.  Strong S1 and S2.

 

ABDOMEN:  Soft, nontender, and nondistended.

 

NEUROLOGIC:  She is alert and oriented x3.  Cranial nerves II through XII are 
intact.

 

MUSCULOSKELETAL:  Left lower extremity, the skin is intact.  There are no open 
wounds or abrasions.  She has some tenderness over the medial joint line.  
Positive Isac's and positive Apley's.  Negative Lachman's.  2+ dorsalis 
pedis pulses. She has intact sensation.  Her lower extremity muscle group 
strengths are intact at 5/5.

 

 ASSESSMENT AND PLAN:  Ms. Aviles is a 78-year-old female with complaints of 
left knee pain and MRI confirms the medial meniscus tear and she has elected to 
proceed with left knee arthroscopy with partial medial meniscectomy, possible 
chondroplasty, possible synovectomy.  Surgery is scheduled for 02/15/18 with 
Dr. De Dios.  Dr. De Dios discussed the risks and benefits of the surgery at today'
s visit and all of her questions were answered.  She was instructed to stop her 
Coumadin 5 days prior to the surgery, she will restart that after the surgery 
was completed and we have also sent her a prescription for Lovenox to take for 
the first 3 days postoperatively.  She will see Dr. De Dios back in clinic 2 
weeks after the surgery.

 

 ____________________________________ KAMARI GOVEA

 

[id number]/[facility] #: [id number]

[place]

## 2018-02-15 ENCOUNTER — HOSPITAL ENCOUNTER (OUTPATIENT)
Dept: HOSPITAL 25 - OR | Age: 78
Discharge: HOME | End: 2018-02-15
Attending: ORTHOPAEDIC SURGERY
Payer: MEDICARE

## 2018-02-15 VITALS — DIASTOLIC BLOOD PRESSURE: 68 MMHG | SYSTOLIC BLOOD PRESSURE: 138 MMHG

## 2018-02-15 DIAGNOSIS — Z79.84: ICD-10-CM

## 2018-02-15 DIAGNOSIS — Z95.1: ICD-10-CM

## 2018-02-15 DIAGNOSIS — E11.9: ICD-10-CM

## 2018-02-15 DIAGNOSIS — Z86.718: ICD-10-CM

## 2018-02-15 DIAGNOSIS — Z79.01: ICD-10-CM

## 2018-02-15 DIAGNOSIS — I25.10: ICD-10-CM

## 2018-02-15 DIAGNOSIS — E78.00: ICD-10-CM

## 2018-02-15 DIAGNOSIS — M23.204: Primary | ICD-10-CM

## 2018-02-15 DIAGNOSIS — M65.862: ICD-10-CM

## 2018-02-15 DIAGNOSIS — I10: ICD-10-CM

## 2018-02-15 DIAGNOSIS — M17.12: ICD-10-CM

## 2018-02-15 LAB — INR PPP/BLD: 0.94 (ref 0.77–1.02)

## 2018-02-15 PROCEDURE — 36415 COLL VENOUS BLD VENIPUNCTURE: CPT

## 2018-02-15 PROCEDURE — 85610 PROTHROMBIN TIME: CPT

## 2018-02-16 NOTE — OP
DATE OF OPERATION:  02/15/18 - [facility]

 

DATE OF BIRTH:  [birth year]

 

SURGEON:  Tracey De Dios MD.

 

ASSISTANT:  KAMARI Chiu.  Mr. Roa did help throughout the procedure 
with preparation of the leg, wound retraction, manipulation of the knee and 
wound closure.

 

ANESTHESIOLOGIST:  Dr. Gonzales.

 

ANESTHESIA:  General.

 

PRE-OP DIAGNOSIS:  Left knee pain with medial meniscal tear.

 

POST-OP DIAGNOSIS:  Left knee medial meniscal tear, degenerative type and 
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 
patellofemoral compartment, anterior synovitis.

 

OPERATIVE PROCEDURE:  Left total knee arthroscopy with partial medial 
meniscectomy and anterior synovectomy.

 

ESTIMATED BLOOD LOSS:  Less than 50 cc.

 

COMPLICATIONS:  None.

 

SPECIMEN:  None.

 

BRIEF HISTORY/INDICATION:  Ms. Aviles is a 78-year-old female with months of 
increasingly severe left knee pain.  She had mechanical symptoms consistent 
with a medial meniscal tear.  She failed conservative treatment with anti-
inflammatories, intraarticular injections and physical therapy.  Due to 
continued pain and decreased quality of life, she elected to undergo left total 
knee arthroscopy with partial medial meniscectomy.  She understood she had some 
mild arthritis on radiographs and would continue to have arthritic pain.  
Informed consent was obtained from the patient.  She understands the risks of 
surgery included but were not limited to bleeding, infection, damage to nearby 
structures, continued pain, need for further surgery, re-tear of the meniscus, 
continued arthritic pain and progression, stroke, heart attack, blood clot, and 
death.  She wished to proceed.

 

INTRAOPERATIVE FINDINGS:  Intraoperatively, the patient was noted to have a 
grade 3 and 4 Outerbridge cartilage changing in the medial and patellofemoral 
compartment with exposed subchondral bone.  She was also noted to have a 
significant amount of anterior synovitis.  She had a degenerative complex tear 
of the medial meniscus 50% posteriorly in the white-red zone.

 

DESCRIPTION OF PROCEDURE:  Ms. Aviles was identified in the preanesthesia unit.  
Her left lower extremity was marked as the correct operative side.  Informed 
consent was signed and placed in the chart.  The patient was taken to the 
operating room and placed under general anesthesia.  Left lower extremity was 
prepped and draped in the usual sterile fashion.  Preop time-out was made to 
correctly identify the patient's side and site.  Appropriate perioperative 
antibiotics were given within 1 hour of incision.

 

A 0.5 cm anterolateral portal incision was made using a 15 blade.  This was 
carried down to the capsule.  Trocar was introduced. As soon as the light and 
water sources were turned on, there was immediate visualization of the 
suprapatellar pouch.  Suprapatellar pouch had no loose body or abnormalities.  
Patellofemoral compartment was reviewed and did have exposed subchondral bone 
along the femoral trochlear groove as well as a normal medial patellar facet 
with some patellar fraying.  These were grade 3 and 4 Outerbridge cartilage 
changes.  Medial gutter showed no significant plica or loose body.  Anteriorly, 
there was quite a bit of synovitis noted.  Medial compartment showed exposed 
subchondral bone and cartilage fraying along the medial femoral condyle.  The 
posterior 50% of the medial meniscus showed a complex tear of the meniscus with 
anterior displacement of the fragment.

 

ACL and PCL were intact.  The knee was placed in a figure-of-four position.  
There was no obvious lateral meniscal tear.  There were no significant 
degenerative changes.  Lateral gutter showed no plica or loose body.

 

Under direct visualization, a medial portal incision was made.  A probe was 
introduced and a second tour of the knee joint was performed.  No additional 
findings were noted.  Shaver and radiofrequency ablation wand were used to 
perform anterior synovectomy.  All inflamed tissue was carefully removed.  Next 
a straight biter was used to perform partial medial meniscectomy.  A smooth 
border of the medial meniscus was obtained in the white-red zone and along the 
posterior 50% of the medial meniscus.  Radiofrequency ablation wand was used to 
further smooth the edge of the meniscus.  A probe was used to ensure there was 
no further tearing.

 

The knee was copiously irrigated with sterile saline.  All instruments were 
removed.  Incisions were closed using interrupted 3-0 nylon suture.  Intra- 
articular injection of 80 mg Depo-Medrol and 6 cc of 0.25% Marcaine was placed 
in the knee joint.  Incisions were covered with Xeroform, 4x4s, and Webril.  
Ace wrap and cold pack were placed over this.  The patient's anesthesia was 
reversed without difficulty. She was taken to the PACU in stable condition.  
Intended weight-bearing will be weightbearing as tolerated.  Intended DVT 
prophylaxis will be to restart her baseline Coumadin doses.

 

 [id number]/[facility] #: [id number]

[facility]